# Patient Record
Sex: FEMALE | Race: WHITE | NOT HISPANIC OR LATINO
[De-identification: names, ages, dates, MRNs, and addresses within clinical notes are randomized per-mention and may not be internally consistent; named-entity substitution may affect disease eponyms.]

---

## 2018-01-11 ENCOUNTER — APPOINTMENT (OUTPATIENT)
Dept: FAMILY MEDICINE | Facility: CLINIC | Age: 33
End: 2018-01-11

## 2018-02-16 ENCOUNTER — APPOINTMENT (OUTPATIENT)
Dept: FAMILY MEDICINE | Facility: CLINIC | Age: 33
End: 2018-02-16
Payer: MEDICAID

## 2018-02-16 VITALS
HEART RATE: 90 BPM | OXYGEN SATURATION: 98 % | DIASTOLIC BLOOD PRESSURE: 100 MMHG | WEIGHT: 293 LBS | SYSTOLIC BLOOD PRESSURE: 160 MMHG | HEIGHT: 67 IN | BODY MASS INDEX: 45.99 KG/M2

## 2018-02-16 DIAGNOSIS — Z86.19 PERSONAL HISTORY OF OTHER INFECTIOUS AND PARASITIC DISEASES: ICD-10-CM

## 2018-02-16 DIAGNOSIS — J10.1 INFLUENZA DUE TO OTHER IDENTIFIED INFLUENZA VIRUS WITH OTHER RESPIRATORY MANIFESTATIONS: ICD-10-CM

## 2018-02-16 DIAGNOSIS — Z87.09 PERSONAL HISTORY OF OTHER DISEASES OF THE RESPIRATORY SYSTEM: ICD-10-CM

## 2018-02-16 DIAGNOSIS — Z87.898 PERSONAL HISTORY OF OTHER SPECIFIED CONDITIONS: ICD-10-CM

## 2018-02-16 DIAGNOSIS — Z87.42 PERSONAL HISTORY OF OTHER DISEASES OF THE FEMALE GENITAL TRACT: ICD-10-CM

## 2018-02-16 DIAGNOSIS — R53.81 OTHER MALAISE: ICD-10-CM

## 2018-02-16 DIAGNOSIS — N83.299 OTHER OVARIAN CYST, UNSPECIFIED SIDE: ICD-10-CM

## 2018-02-16 DIAGNOSIS — J98.01 ACUTE BRONCHOSPASM: ICD-10-CM

## 2018-02-16 DIAGNOSIS — Z87.01 PERSONAL HISTORY OF PNEUMONIA (RECURRENT): ICD-10-CM

## 2018-02-16 LAB — CYTOLOGY CVX/VAG DOC THIN PREP: NORMAL

## 2018-02-16 PROCEDURE — 99214 OFFICE O/P EST MOD 30 MIN: CPT

## 2018-03-02 ENCOUNTER — APPOINTMENT (OUTPATIENT)
Dept: FAMILY MEDICINE | Facility: CLINIC | Age: 33
End: 2018-03-02

## 2018-03-12 ENCOUNTER — APPOINTMENT (OUTPATIENT)
Dept: FAMILY MEDICINE | Facility: CLINIC | Age: 33
End: 2018-03-12
Payer: MEDICAID

## 2018-03-12 VITALS
BODY MASS INDEX: 45.99 KG/M2 | DIASTOLIC BLOOD PRESSURE: 70 MMHG | HEART RATE: 92 BPM | OXYGEN SATURATION: 97 % | WEIGHT: 293 LBS | SYSTOLIC BLOOD PRESSURE: 130 MMHG | HEIGHT: 67 IN

## 2018-03-12 DIAGNOSIS — R51 HEADACHE: ICD-10-CM

## 2018-03-12 PROCEDURE — 99214 OFFICE O/P EST MOD 30 MIN: CPT

## 2018-03-21 ENCOUNTER — LABORATORY RESULT (OUTPATIENT)
Age: 33
End: 2018-03-21

## 2018-03-21 ENCOUNTER — APPOINTMENT (OUTPATIENT)
Dept: FAMILY MEDICINE | Facility: CLINIC | Age: 33
End: 2018-03-21
Payer: MEDICAID

## 2018-03-21 VITALS
BODY MASS INDEX: 45.99 KG/M2 | HEIGHT: 67 IN | OXYGEN SATURATION: 98 % | WEIGHT: 293 LBS | HEART RATE: 81 BPM | SYSTOLIC BLOOD PRESSURE: 120 MMHG | DIASTOLIC BLOOD PRESSURE: 70 MMHG

## 2018-03-21 LAB
ALBUMIN SERPL ELPH-MCNC: 4.2 G/DL
ALP BLD-CCNC: 55 U/L
ALT SERPL-CCNC: 20 U/L
ANION GAP SERPL CALC-SCNC: 13 MMOL/L
AST SERPL-CCNC: 25 U/L
BASOPHILS # BLD AUTO: 0.02 K/UL
BASOPHILS NFR BLD AUTO: 0.5 %
BILIRUB SERPL-MCNC: 0.4 MG/DL
BUN SERPL-MCNC: 10 MG/DL
CALCIUM SERPL-MCNC: 9.3 MG/DL
CHLORIDE SERPL-SCNC: 105 MMOL/L
CHOLEST SERPL-MCNC: 114 MG/DL
CHOLEST/HDLC SERPL: 3.6 RATIO
CO2 SERPL-SCNC: 23 MMOL/L
CREAT SERPL-MCNC: 0.74 MG/DL
EOSINOPHIL # BLD AUTO: 0.07 K/UL
EOSINOPHIL NFR BLD AUTO: 1.6 %
GLUCOSE SERPL-MCNC: 95 MG/DL
HCT VFR BLD CALC: 24.5 %
HDLC SERPL-MCNC: 32 MG/DL
HGB BLD-MCNC: 6.9 G/DL
IMM GRANULOCYTES NFR BLD AUTO: 0.5 %
IRON SATN MFR SERPL: 4 %
IRON SERPL-MCNC: 15 UG/DL
LDLC SERPL CALC-MCNC: 65 MG/DL
LYMPHOCYTES # BLD AUTO: 1.21 K/UL
LYMPHOCYTES NFR BLD AUTO: 27.9 %
MAN DIFF?: NORMAL
MCHC RBC-ENTMCNC: 20.4 PG
MCHC RBC-ENTMCNC: 28.2 GM/DL
MCV RBC AUTO: 72.5 FL
MONOCYTES # BLD AUTO: 0.24 K/UL
MONOCYTES NFR BLD AUTO: 5.5 %
NEUTROPHILS # BLD AUTO: 2.78 K/UL
NEUTROPHILS NFR BLD AUTO: 64 %
PLATELET # BLD AUTO: 288 K/UL
POTASSIUM SERPL-SCNC: 4.7 MMOL/L
PROT SERPL-MCNC: 7.5 G/DL
RBC # BLD: 3.38 M/UL
RBC # FLD: 16.5 %
SODIUM SERPL-SCNC: 141 MMOL/L
TIBC SERPL-MCNC: 374 UG/DL
TRIGL SERPL-MCNC: 86 MG/DL
UIBC SERPL-MCNC: 359 UG/DL
WBC # FLD AUTO: 4.34 K/UL

## 2018-03-21 PROCEDURE — 36415 COLL VENOUS BLD VENIPUNCTURE: CPT

## 2018-03-21 PROCEDURE — 99395 PREV VISIT EST AGE 18-39: CPT | Mod: 25

## 2018-03-22 LAB
FERRITIN SERPL-MCNC: 3 NG/ML
FOLATE SERPL-MCNC: >20 NG/ML
HBA1C MFR BLD HPLC: 5.1 %
T4 FREE SERPL-MCNC: 1.1 NG/DL
TSH SERPL-ACNC: 2.06 UIU/ML
VIT B12 SERPL-MCNC: 204 PG/ML

## 2018-03-23 ENCOUNTER — APPOINTMENT (OUTPATIENT)
Dept: FAMILY MEDICINE | Facility: CLINIC | Age: 33
End: 2018-03-23
Payer: MEDICAID

## 2018-03-23 VITALS
OXYGEN SATURATION: 97 % | DIASTOLIC BLOOD PRESSURE: 80 MMHG | BODY MASS INDEX: 45.99 KG/M2 | HEIGHT: 67 IN | SYSTOLIC BLOOD PRESSURE: 140 MMHG | HEART RATE: 91 BPM | WEIGHT: 293 LBS

## 2018-03-23 PROCEDURE — 96372 THER/PROPH/DIAG INJ SC/IM: CPT

## 2018-03-23 PROCEDURE — 99214 OFFICE O/P EST MOD 30 MIN: CPT | Mod: 25

## 2018-03-23 RX ORDER — CYANOCOBALAMIN 1000 UG/ML
1000 INJECTION INTRAMUSCULAR; SUBCUTANEOUS
Qty: 0 | Refills: 0 | Status: COMPLETED | OUTPATIENT
Start: 2018-03-23

## 2018-03-23 RX ADMIN — CYANOCOBALAMIN 0 MCG/ML: 1000 INJECTION INTRAMUSCULAR; SUBCUTANEOUS at 00:00

## 2018-03-30 ENCOUNTER — APPOINTMENT (OUTPATIENT)
Dept: FAMILY MEDICINE | Facility: CLINIC | Age: 33
End: 2018-03-30
Payer: MEDICAID

## 2018-03-30 VITALS
DIASTOLIC BLOOD PRESSURE: 80 MMHG | BODY MASS INDEX: 45.99 KG/M2 | HEIGHT: 67 IN | SYSTOLIC BLOOD PRESSURE: 138 MMHG | HEART RATE: 87 BPM | WEIGHT: 293 LBS | OXYGEN SATURATION: 99 %

## 2018-03-30 PROCEDURE — 99214 OFFICE O/P EST MOD 30 MIN: CPT | Mod: 25

## 2018-03-30 PROCEDURE — 96372 THER/PROPH/DIAG INJ SC/IM: CPT

## 2018-03-30 RX ORDER — CYANOCOBALAMIN 1000 UG/ML
1000 INJECTION INTRAMUSCULAR; SUBCUTANEOUS
Qty: 0 | Refills: 0 | Status: COMPLETED | OUTPATIENT
Start: 2018-03-30

## 2018-03-30 RX ADMIN — CYANOCOBALAMIN 0 MCG/ML: 1000 INJECTION INTRAMUSCULAR; SUBCUTANEOUS at 00:00

## 2018-04-06 ENCOUNTER — APPOINTMENT (OUTPATIENT)
Dept: FAMILY MEDICINE | Facility: CLINIC | Age: 33
End: 2018-04-06

## 2018-04-13 ENCOUNTER — APPOINTMENT (OUTPATIENT)
Dept: FAMILY MEDICINE | Facility: CLINIC | Age: 33
End: 2018-04-13
Payer: MEDICAID

## 2018-04-13 VITALS
WEIGHT: 293 LBS | HEART RATE: 85 BPM | BODY MASS INDEX: 45.99 KG/M2 | SYSTOLIC BLOOD PRESSURE: 140 MMHG | OXYGEN SATURATION: 97 % | HEIGHT: 67 IN | DIASTOLIC BLOOD PRESSURE: 80 MMHG

## 2018-04-13 PROCEDURE — 99214 OFFICE O/P EST MOD 30 MIN: CPT | Mod: 25

## 2018-04-13 PROCEDURE — 96372 THER/PROPH/DIAG INJ SC/IM: CPT

## 2018-04-13 RX ORDER — CYANOCOBALAMIN 1000 UG/ML
1000 INJECTION INTRAMUSCULAR; SUBCUTANEOUS
Qty: 0 | Refills: 0 | Status: COMPLETED | OUTPATIENT
Start: 2018-04-13

## 2018-04-13 RX ADMIN — CYANOCOBALAMIN 0 MCG/ML: 1000 INJECTION INTRAMUSCULAR; SUBCUTANEOUS at 00:00

## 2018-04-20 ENCOUNTER — APPOINTMENT (OUTPATIENT)
Dept: FAMILY MEDICINE | Facility: CLINIC | Age: 33
End: 2018-04-20
Payer: MEDICAID

## 2018-04-20 VITALS
BODY MASS INDEX: 45.99 KG/M2 | OXYGEN SATURATION: 98 % | HEIGHT: 67 IN | DIASTOLIC BLOOD PRESSURE: 82 MMHG | SYSTOLIC BLOOD PRESSURE: 130 MMHG | WEIGHT: 293 LBS | HEART RATE: 90 BPM

## 2018-04-20 PROCEDURE — 96372 THER/PROPH/DIAG INJ SC/IM: CPT

## 2018-04-20 PROCEDURE — 99214 OFFICE O/P EST MOD 30 MIN: CPT | Mod: 25

## 2018-04-20 RX ORDER — CYANOCOBALAMIN 1000 UG/ML
1000 INJECTION INTRAMUSCULAR; SUBCUTANEOUS
Qty: 0 | Refills: 0 | Status: COMPLETED | OUTPATIENT
Start: 2018-04-20

## 2018-04-20 RX ADMIN — CYANOCOBALAMIN 0 MCG/ML: 1000 INJECTION INTRAMUSCULAR; SUBCUTANEOUS at 00:00

## 2018-04-21 LAB
FOLATE SERPL-MCNC: NORMAL
VIT B12 SERPL-MCNC: 434 PG/ML

## 2018-07-09 ENCOUNTER — APPOINTMENT (OUTPATIENT)
Dept: FAMILY MEDICINE | Facility: CLINIC | Age: 33
End: 2018-07-09
Payer: MEDICAID

## 2018-07-09 VITALS
RESPIRATION RATE: 18 BRPM | SYSTOLIC BLOOD PRESSURE: 118 MMHG | DIASTOLIC BLOOD PRESSURE: 70 MMHG | OXYGEN SATURATION: 99 % | WEIGHT: 293 LBS | HEART RATE: 80 BPM | HEIGHT: 67 IN | BODY MASS INDEX: 45.99 KG/M2

## 2018-07-09 PROCEDURE — 99214 OFFICE O/P EST MOD 30 MIN: CPT

## 2018-07-09 NOTE — HEALTH RISK ASSESSMENT
[] : No [No falls in past year] : Patient reported no falls in the past year [0] : 2) Feeling down, depressed, or hopeless: Not at all (0) [Good] : ~his/her~  mood as  good [Patient reported PAP Smear was normal] : Patient reported PAP Smear was normal [None] : None [With Significant Other] : lives with significant other [With Family] : lives with family [Employed] : employed [] :  [# Of Children ___] : has [unfilled] children [Fully functional (bathing, dressing, toileting, transferring, walking, feeding)] : Fully functional (bathing, dressing, toileting, transferring, walking, feeding) [Fully functional (using the telephone, shopping, preparing meals, housekeeping, doing laundry, using] : Fully functional and needs no help or supervision to perform IADLs (using the telephone, shopping, preparing meals, housekeeping, doing laundry, using transportation, managing medications and managing finances) [PapSmearDate] : 2017 [Discussed at today's visit] : Advance Directives Discussed at today's visit [FreeTextEntry4] : none

## 2018-07-09 NOTE — ASSESSMENT
[FreeTextEntry1] : patient was vitamin b12 deficiency and severely anemic. patient with known anemia - patient with 3 week periods.  saw mehrdad,  - everything was fine\par birth control controlling her periods, feeling much better, less tired\par \par Discussed diagnosis of hypertension with patient and need for medication compliance and possible side affects and risks of noncompliance. Patient was told to adhere to a low salt diet and try to incorporate exercise daily.\par stable\par The diagnosis of obesity was discussed with the patient. The patient was counseled on diet and exercise. Patient was advised to eat a diet low in carbohydrates and low in fat with high protein diet with plenty of vegetables. Patient was counseled to eat small meals throughout the day avoiding carbohydrates, foods high in fat, foods that are fried and fast food.  Patient was advised to monitor fluid intake, to drink at least 8 glasses of pure water a day and reduce/stop intake of all sugar drinks including juice and soda. Patient was advised to try and exercise for 30-40 minutes per day for at least three days a week. Pros and cons of using medical management for weight loss versus diet/exercise alone was discussed. Medication options were provided for the patient and side affects and risks were identified and discussed.  Patient was advised to take any medications as prescribed and to call office or go to the ER immediately if any issues with the medications occur. All questions were answered.\par \par phentermine - reviewed category x - restart phentermine

## 2018-07-09 NOTE — HISTORY OF PRESENT ILLNESS
[de-identified] : 32 year old female is here for a followup visit. Patient is here for medication renewals and for blood work discussion. Medications and allergies were reviewed and assessed.  There has been no new medications since the last visit. Patient is feeling well with no active changes or issues since Her last visit.\par patient feeling better, periods have become more regular\par feels phentermine was helping a lot\par

## 2018-08-06 ENCOUNTER — APPOINTMENT (OUTPATIENT)
Dept: FAMILY MEDICINE | Facility: CLINIC | Age: 33
End: 2018-08-06
Payer: MEDICAID

## 2018-08-06 VITALS
RESPIRATION RATE: 18 BRPM | WEIGHT: 293 LBS | OXYGEN SATURATION: 100 % | DIASTOLIC BLOOD PRESSURE: 72 MMHG | SYSTOLIC BLOOD PRESSURE: 124 MMHG | HEART RATE: 71 BPM | BODY MASS INDEX: 45.99 KG/M2 | HEIGHT: 67 IN

## 2018-08-06 PROCEDURE — 99214 OFFICE O/P EST MOD 30 MIN: CPT

## 2018-08-06 NOTE — HISTORY OF PRESENT ILLNESS
[de-identified] : 32 year old female is here for a followup visit. Patient is here for medication renewals and for blood work discussion. Medications and allergies were reviewed and assessed.  There has been no new medications since the last visit. Patient is feeling well with no active changes or issues since Her last visit.\par patient feeling better, periods have become more regular\par feels phentermine was helping, but now feels its wearing off at night and then she is binging\par

## 2018-08-06 NOTE — ASSESSMENT
[FreeTextEntry1] : patient was vitamin b12 deficiency and severely anemic. patient with known anemia - patient with 3 week periods.  saw mehrdad,  - everything was fine\par birth control controlling her periods, feeling much better, less tired\par \par Discussed diagnosis of hypertension with patient and need for medication compliance and possible side affects and risks of noncompliance. Patient was told to adhere to a low salt diet and try to incorporate exercise daily.\par stable\par \par The diagnosis of obesity was discussed with the patient. The patient was counseled on diet and exercise. Patient was advised to eat a diet low in carbohydrates and low in fat with high protein diet with plenty of vegetables. Patient was counseled to eat small meals throughout the day avoiding carbohydrates, foods high in fat, foods that are fried and fast food.  Patient was advised to monitor fluid intake, to drink at least 8 glasses of pure water a day and reduce/stop intake of all sugar drinks including juice and soda. Patient was advised to try and exercise for 30-40 minutes per day for at least three days a week. Pros and cons of using medical management for weight loss versus diet/exercise alone was discussed. Medication options were provided for the patient and side affects and risks were identified and discussed.  Patient was advised to take any medications as prescribed and to call office or go to the ER immediately if any issues with the medications occur. All questions were answered.\par \par phentermine - reviewed category x - will increase phentermine to 30 and reassess in 4 weeks

## 2018-08-06 NOTE — PHYSICAL EXAM
[Well Nourished] : well nourished [Normal Oropharynx] : the oropharynx was normal [Clear to Auscultation] : lungs were clear to auscultation bilaterally [No Accessory Muscle Use] : no accessory muscle use [Regular Rhythm] : with a regular rhythm [Normal S1, S2] : normal S1 and S2 [Non Tender] : non-tender [No CVA Tenderness] : no CVA  tenderness [No Joint Swelling] : no joint swelling [Normal Gait] : normal gait [Normal Affect] : the affect was normal

## 2018-08-20 ENCOUNTER — MEDICATION RENEWAL (OUTPATIENT)
Age: 33
End: 2018-08-20

## 2018-09-10 ENCOUNTER — APPOINTMENT (OUTPATIENT)
Dept: FAMILY MEDICINE | Facility: CLINIC | Age: 33
End: 2018-09-10

## 2018-10-08 ENCOUNTER — APPOINTMENT (OUTPATIENT)
Dept: FAMILY MEDICINE | Facility: CLINIC | Age: 33
End: 2018-10-08

## 2018-10-15 ENCOUNTER — APPOINTMENT (OUTPATIENT)
Dept: FAMILY MEDICINE | Facility: CLINIC | Age: 33
End: 2018-10-15
Payer: MEDICAID

## 2018-10-15 VITALS
OXYGEN SATURATION: 98 % | BODY MASS INDEX: 45.99 KG/M2 | HEIGHT: 67 IN | HEART RATE: 100 BPM | SYSTOLIC BLOOD PRESSURE: 132 MMHG | RESPIRATION RATE: 18 BRPM | WEIGHT: 293 LBS | DIASTOLIC BLOOD PRESSURE: 70 MMHG

## 2018-10-15 LAB — CYTOLOGY CVX/VAG DOC THIN PREP: NORMAL

## 2018-10-15 PROCEDURE — 99215 OFFICE O/P EST HI 40 MIN: CPT | Mod: 25

## 2018-10-15 PROCEDURE — 36415 COLL VENOUS BLD VENIPUNCTURE: CPT

## 2018-10-15 NOTE — ASSESSMENT
[FreeTextEntry1] : patient was vitamin b12 deficiency and severely anemic. patient with known anemia - patient with 3 week periods.  saw mehrdad,  -  \par birth control controlling her periods, feeling much better, less tired - \par patient has not followed up with injections\par \par Discussed diagnosis of hypertension with patient and need for medication compliance and possible side affects and risks of noncompliance. Patient was told to adhere to a low salt diet and try to incorporate exercise daily.\par stable\par \par The diagnosis of obesity was discussed with the patient. The patient was counseled on diet and exercise. Patient was advised to eat a diet low in carbohydrates and low in fat with high protein diet with plenty of vegetables. Patient was counseled to eat small meals throughout the day avoiding carbohydrates, foods high in fat, foods that are fried and fast food.  Patient was advised to monitor fluid intake, to drink at least 8 glasses of pure water a day and reduce/stop intake of all sugar drinks including juice and soda. Patient was advised to try and exercise for 30-40 minutes per day for at least three days a week. Pros and cons of using medical management for weight loss versus diet/exercise alone was discussed. Medication options were provided for the patient and side affects and risks were identified and discussed.  Patient was advised to take any medications as prescribed and to call office or go to the ER immediately if any issues with the medications occur. All questions were answered.\par \par phentermine - reviewed category x - hold off on phentermine for now\par \par Discussed diagnosis of anxiety and depression with the patient and potential outcomes/side affects of treatment versus non treatment. Medications were assessed and described at length. Side affects and black box warning were discussed.  Patient was advised to continue will all medications prescribed and the need for compliance was discussed and emphasized. Patient was advised to not stop medications without discussing with a health care provider first. Patient was advised to continue psychotherapy or seek therapy if not currently attending. Patient was educated on addictive potential of controlled substances and was counseled to use only as needed and sparingly. Patient verbalized understanding of all the above.\par patient in therapy - will start lexapro

## 2018-10-15 NOTE — HISTORY OF PRESENT ILLNESS
[de-identified] : 32 year old female is here for a followup visit. Patient is here for medication renewals and for blood work discussion. Medications and allergies were reviewed and assessed.  There has been no new medications since the last visit. Patient is feeling well with no active changes or issues since Her last visit.\par patient feeling better, periods have become more regular - has not had vitamin b12 injections\par patient going to therapy - was diagnosed with depression and anxiety\par patients father  2016\par

## 2018-10-16 LAB
ALBUMIN SERPL ELPH-MCNC: 4.6 G/DL
ALP BLD-CCNC: 57 U/L
ALT SERPL-CCNC: 16 U/L
ANION GAP SERPL CALC-SCNC: 13 MMOL/L
AST SERPL-CCNC: 17 U/L
BASOPHILS # BLD AUTO: 0.02 K/UL
BASOPHILS NFR BLD AUTO: 0.3 %
BILIRUB SERPL-MCNC: 0.3 MG/DL
BUN SERPL-MCNC: 8 MG/DL
CALCIUM SERPL-MCNC: 9.6 MG/DL
CHLORIDE SERPL-SCNC: 104 MMOL/L
CHOLEST SERPL-MCNC: 124 MG/DL
CHOLEST/HDLC SERPL: 3.1 RATIO
CO2 SERPL-SCNC: 23 MMOL/L
CREAT SERPL-MCNC: 0.8 MG/DL
EOSINOPHIL # BLD AUTO: 0.05 K/UL
EOSINOPHIL NFR BLD AUTO: 0.7 %
FOLATE SERPL-MCNC: 19.8 NG/ML
GLUCOSE SERPL-MCNC: 85 MG/DL
HBA1C MFR BLD HPLC: 5.4 %
HCT VFR BLD CALC: 34 %
HDLC SERPL-MCNC: 40 MG/DL
HGB BLD-MCNC: 9.8 G/DL
IMM GRANULOCYTES NFR BLD AUTO: 0.1 %
IRON SATN MFR SERPL: 4 %
IRON SERPL-MCNC: 20 UG/DL
LDLC SERPL CALC-MCNC: 69 MG/DL
LYMPHOCYTES # BLD AUTO: 1.72 K/UL
LYMPHOCYTES NFR BLD AUTO: 25.1 %
MAN DIFF?: NORMAL
MCHC RBC-ENTMCNC: 22.8 PG
MCHC RBC-ENTMCNC: 28.8 GM/DL
MCV RBC AUTO: 79.3 FL
MONOCYTES # BLD AUTO: 0.42 K/UL
MONOCYTES NFR BLD AUTO: 6.1 %
NEUTROPHILS # BLD AUTO: 4.62 K/UL
NEUTROPHILS NFR BLD AUTO: 67.7 %
PLATELET # BLD AUTO: 275 K/UL
POTASSIUM SERPL-SCNC: 4.3 MMOL/L
PROT SERPL-MCNC: 7.7 G/DL
RBC # BLD: 4.29 M/UL
RBC # FLD: 15.8 %
SODIUM SERPL-SCNC: 140 MMOL/L
T4 FREE SERPL-MCNC: 1.3 NG/DL
TIBC SERPL-MCNC: 450 UG/DL
TRIGL SERPL-MCNC: 74 MG/DL
TSH SERPL-ACNC: 3.54 UIU/ML
UIBC SERPL-MCNC: 430 UG/DL
VIT B12 SERPL-MCNC: 277 PG/ML
WBC # FLD AUTO: 6.84 K/UL

## 2018-10-17 LAB
THYROGLOB AB SERPL-ACNC: <20 IU/ML
THYROPEROXIDASE AB SERPL IA-ACNC: <10 IU/ML

## 2019-01-03 ENCOUNTER — TRANSCRIPTION ENCOUNTER (OUTPATIENT)
Age: 34
End: 2019-01-03

## 2019-01-30 ENCOUNTER — TRANSCRIPTION ENCOUNTER (OUTPATIENT)
Age: 34
End: 2019-01-30

## 2019-02-04 ENCOUNTER — APPOINTMENT (OUTPATIENT)
Dept: FAMILY MEDICINE | Facility: CLINIC | Age: 34
End: 2019-02-04
Payer: MEDICAID

## 2019-02-04 VITALS
HEIGHT: 67 IN | WEIGHT: 293 LBS | BODY MASS INDEX: 45.99 KG/M2 | SYSTOLIC BLOOD PRESSURE: 130 MMHG | DIASTOLIC BLOOD PRESSURE: 90 MMHG

## 2019-02-04 DIAGNOSIS — R80.9 PROTEINURIA, UNSPECIFIED: ICD-10-CM

## 2019-02-04 PROCEDURE — 99214 OFFICE O/P EST MOD 30 MIN: CPT

## 2019-02-04 RX ORDER — PHENTERMINE HYDROCHLORIDE 30 MG/1
30 CAPSULE ORAL
Qty: 30 | Refills: 0 | Status: DISCONTINUED | COMMUNITY
Start: 2018-08-20 | End: 2019-02-04

## 2019-02-04 RX ORDER — NORETHINDRONE 0.35 MG/1
0.35 TABLET ORAL
Refills: 0 | Status: DISCONTINUED | COMMUNITY
End: 2019-02-04

## 2019-02-04 NOTE — HISTORY OF PRESENT ILLNESS
[de-identified] : 32 year old female is here for a followup visit after going to urgent care and having protein in her urine. Pradipn also found to have protein in her urine. Patient is here for medication renewals and for blood work discussion. Medications and allergies were reviewed and assessed.  There has been no new medications since the last visit. Patient is feeling well with no active changes or issues since Her last visit.\par patient feeling better, periods have become more regular - has not had vitamin b12 injections\par patient going to therapy - was diagnosed with depression and anxiety\par patients father  2016\par

## 2019-02-04 NOTE — ASSESSMENT
[FreeTextEntry1] : patient was vitamin b12 deficiency and severely anemic. patient with known anemia - patient with 3 week periods.  saw mehrdad,  -  \par birth control controlling her periods, feeling much better, less tired - \par patient has not followed up with injections\par \par Discussed diagnosis of hypertension with patient and need for medication compliance and possible side affects and risks of noncompliance. Patient was told to adhere to a low salt diet and try to incorporate exercise daily.\par stable\par \par The diagnosis of obesity was discussed with the patient. The patient was counseled on diet and exercise. Patient was advised to eat a diet low in carbohydrates and low in fat with high protein diet with plenty of vegetables. Patient was counseled to eat small meals throughout the day avoiding carbohydrates, foods high in fat, foods that are fried and fast food.  Patient was advised to monitor fluid intake, to drink at least 8 glasses of pure water a day and reduce/stop intake of all sugar drinks including juice and soda. Patient was advised to try and exercise for 30-40 minutes per day for at least three days a week. Pros and cons of using medical management for weight loss versus diet/exercise alone was discussed. Medication options were provided for the patient and side affects and risks were identified and discussed.  Patient was advised to take any medications as prescribed and to call office or go to the ER immediately if any issues with the medications occur. All questions were answered.\par \par \par Discussed diagnosis of anxiety and depression with the patient and potential outcomes/side affects of treatment versus non treatment. Medications were assessed and described at length. Side affects and black box warning were discussed.  Patient was advised to continue will all medications prescribed and the need for compliance was discussed and emphasized. Patient was advised to not stop medications without discussing with a health care provider first. Patient was advised to continue psychotherapy or seek therapy if not currently attending. Patient was educated on addictive potential of controlled substances and was counseled to use only as needed and sparingly. Patient verbalized understanding of all the above.\par always battling depression and reports that she is not enjoying her children and life\par patient in therapy - never started the lexapro - will start now\par \par urine - will recheck and culture

## 2019-02-05 LAB
APPEARANCE: CLEAR
BACTERIA: NEGATIVE
BILIRUBIN URINE: NEGATIVE
BLOOD URINE: ABNORMAL
COLOR: YELLOW
GLUCOSE QUALITATIVE U: NEGATIVE MG/DL
HYALINE CASTS: 5 /LPF
KETONES URINE: NEGATIVE
LEUKOCYTE ESTERASE URINE: NEGATIVE
MICROSCOPIC-UA: NORMAL
NITRITE URINE: NEGATIVE
PH URINE: 7
PROTEIN URINE: NEGATIVE MG/DL
RED BLOOD CELLS URINE: 2 /HPF
SPECIFIC GRAVITY URINE: 1.02
SQUAMOUS EPITHELIAL CELLS: 2 /HPF
UROBILINOGEN URINE: NEGATIVE MG/DL
WHITE BLOOD CELLS URINE: 1 /HPF

## 2019-02-07 LAB — BACTERIA UR CULT: NORMAL

## 2019-03-11 ENCOUNTER — MEDICATION RENEWAL (OUTPATIENT)
Age: 34
End: 2019-03-11

## 2019-04-05 ENCOUNTER — APPOINTMENT (OUTPATIENT)
Dept: FAMILY MEDICINE | Facility: CLINIC | Age: 34
End: 2019-04-05
Payer: MEDICAID

## 2019-04-05 VITALS
BODY MASS INDEX: 45.99 KG/M2 | HEIGHT: 67 IN | DIASTOLIC BLOOD PRESSURE: 80 MMHG | SYSTOLIC BLOOD PRESSURE: 130 MMHG | WEIGHT: 293 LBS

## 2019-04-05 PROCEDURE — 99214 OFFICE O/P EST MOD 30 MIN: CPT

## 2019-04-05 NOTE — ASSESSMENT
[FreeTextEntry1] : patient was vitamin b12 deficiency and severely anemic. patient with known anemia - patient with 3 week periods.  saw mehrdad,  -  \par birth control controlling her periods, feeling much better, less tired - \par patient has not followed up with injections\par \par Discussed diagnosis of hypertension with patient and need for medication compliance and possible side affects and risks of noncompliance. Patient was told to adhere to a low salt diet and try to incorporate exercise daily.\par stable\par \par The diagnosis of obesity was discussed with the patient. The patient was counseled on diet and exercise. Patient was advised to eat a diet low in carbohydrates and low in fat with high protein diet with plenty of vegetables. Patient was counseled to eat small meals throughout the day avoiding carbohydrates, foods high in fat, foods that are fried and fast food.  Patient was advised to monitor fluid intake, to drink at least 8 glasses of pure water a day and reduce/stop intake of all sugar drinks including juice and soda. Patient was advised to try and exercise for 30-40 minutes per day for at least three days a week. Pros and cons of using medical management for weight loss versus diet/exercise alone was discussed. Medication options were provided for the patient and side affects and risks were identified and discussed.  Patient was advised to take any medications as prescribed and to call office or go to the ER immediately if any issues with the medications occur. All questions were answered.\par patient working out - feeling better\par \par Discussed diagnosis of anxiety and depression with the patient and potential outcomes/side affects of treatment versus non treatment. Medications were assessed and described at length. Side affects and black box warning were discussed.  Patient was advised to continue will all medications prescribed and the need for compliance was discussed and emphasized. Patient was advised to not stop medications without discussing with a health care provider first. Patient was advised to continue psychotherapy or seek therapy if not currently attending. Patient was educated on addictive potential of controlled substances and was counseled to use only as needed and sparingly. Patient verbalized understanding of all the above.\par always battling depression and reports that she is not enjoying her children and life\par patient in therapy - now on lexapro 10 - doing much better but tired\par will switch to morning\par \par

## 2019-04-05 NOTE — PHYSICAL EXAM
[Well Nourished] : well nourished [Normal Oropharynx] : the oropharynx was normal [Clear to Auscultation] : lungs were clear to auscultation bilaterally [No Accessory Muscle Use] : no accessory muscle use [Regular Rhythm] : with a regular rhythm [Normal S1, S2] : normal S1 and S2 [Non Tender] : non-tender [No CVA Tenderness] : no CVA  tenderness [No Joint Swelling] : no joint swelling [Normal Gait] : normal gait [Normal Affect] : the affect was normal [de-identified] : happier

## 2019-04-05 NOTE — HISTORY OF PRESENT ILLNESS
[de-identified] : 33 year old female is here for a followup visit for mood/depression. Medications and allergies were reviewed and assessed.  There has been no new medications since the last visit. Patient is feeling well with no active changes or issues since Her last visit.\par \par patient feeling better, periods have become more regular - has not had vitamin b12 injections\par patient going to therapy - was diagnosed with depression and anxiety\par now on lexapro and is feeling better although its tired\par patients father  2016\par

## 2019-04-05 NOTE — REVIEW OF SYSTEMS
[Fatigue] : fatigue [Anxiety] : anxiety [Depression] : depression [Negative] : Heme/Lymph [de-identified] : improved

## 2019-04-08 ENCOUNTER — TRANSCRIPTION ENCOUNTER (OUTPATIENT)
Age: 34
End: 2019-04-08

## 2019-06-04 ENCOUNTER — APPOINTMENT (OUTPATIENT)
Dept: FAMILY MEDICINE | Facility: CLINIC | Age: 34
End: 2019-06-04
Payer: MEDICAID

## 2019-06-04 ENCOUNTER — APPOINTMENT (OUTPATIENT)
Dept: ENDOCRINOLOGY | Facility: CLINIC | Age: 34
End: 2019-06-04
Payer: MEDICAID

## 2019-06-04 VITALS
DIASTOLIC BLOOD PRESSURE: 80 MMHG | BODY MASS INDEX: 45.99 KG/M2 | RESPIRATION RATE: 18 BRPM | HEART RATE: 65 BPM | HEIGHT: 67 IN | SYSTOLIC BLOOD PRESSURE: 130 MMHG | OXYGEN SATURATION: 99 % | WEIGHT: 293 LBS

## 2019-06-04 PROCEDURE — 96372 THER/PROPH/DIAG INJ SC/IM: CPT

## 2019-06-04 PROCEDURE — 36415 COLL VENOUS BLD VENIPUNCTURE: CPT

## 2019-06-04 PROCEDURE — 99204 OFFICE O/P NEW MOD 45 MIN: CPT | Mod: 25

## 2019-06-04 PROCEDURE — 99214 OFFICE O/P EST MOD 30 MIN: CPT | Mod: 25

## 2019-06-04 RX ORDER — CYANOCOBALAMIN 1000 UG/ML
1000 INJECTION INTRAMUSCULAR; SUBCUTANEOUS
Qty: 0 | Refills: 0 | Status: COMPLETED | OUTPATIENT
Start: 2019-06-04

## 2019-06-04 RX ADMIN — CYANOCOBALAMIN 0 MCG/ML: 1000 INJECTION INTRAMUSCULAR; SUBCUTANEOUS at 00:00

## 2019-06-04 NOTE — ASSESSMENT
[FreeTextEntry1] : Possible underlying PCOS, but would need to exclude other endocrinopathies, including NC-CAH and Cushing syndrome.\par  - will check  testosterone, estradiol, gonadotropins, prolactin,  insulin/glucose levels and 17-OHP/pregnenolone, thyroid panel, a1c, fasting insulin\par  - 24hrs UFC \par If suspicious for NC-CAH, will proceed with ACTH stimulation test +/- genetic testing. \par Genetic implications of NC-CAH reviewed with the patient.\par  If confirmed insulin resistance, will consider trial of Metformin. Meanwhile continue OCP's.\par - Current approaches to weight management are discussed with the patient. \par Suggested extensive nutritional education program. Proper dietary restrictions and exercise routines discussed. \par Medical weight loss therapies were reviewed with the patient.\par Discussed with the patient referral to a bariatric surgeon, and she wants to discuss this with her .\par RTC 2-3 weeks for results\par \par \par

## 2019-06-04 NOTE — CONSULT LETTER
[Dear  ___] : Dear  [unfilled], [Courtesy Letter:] : I had the pleasure of seeing your patient, [unfilled], in my office today. [Sincerely,] : Sincerely, [FreeTextEntry1] : Thank you for referring  Ms. RANI CHRISTIE to me for evaluation and treatment. Please, see attached consultation note. As always, if there are specific questions you would like to discuss, please feel free to contact me.\par Thank you for the courtesy of this evaluation.\par  [FreeTextEntry3] : Ced Villavicencio MD, FACE, [facility]\par

## 2019-06-04 NOTE — HEALTH RISK ASSESSMENT
[No falls in past year] : Patient reported no falls in the past year [0] : 2) Feeling down, depressed, or hopeless: Not at all (0) [Good] : ~his/her~  mood as  good [Patient reported PAP Smear was normal] : Patient reported PAP Smear was normal [None] : None [With Significant Other] : lives with significant other [With Family] : lives with family [Employed] : employed [] :  [# Of Children ___] : has [unfilled] children [Fully functional (bathing, dressing, toileting, transferring, walking, feeding)] : Fully functional (bathing, dressing, toileting, transferring, walking, feeding) [Fully functional (using the telephone, shopping, preparing meals, housekeeping, doing laundry, using] : Fully functional and needs no help or supervision to perform IADLs (using the telephone, shopping, preparing meals, housekeeping, doing laundry, using transportation, managing medications and managing finances) [Discussed at today's visit] : Advance Directives Discussed at today's visit [] : No [PapSmearDate] : 2017 [FreeTextEntry4] : none

## 2019-06-04 NOTE — REVIEW OF SYSTEMS
[Fatigue] : fatigue [Anxiety] : anxiety [Depression] : depression [Negative] : Heme/Lymph [de-identified] : improved

## 2019-06-04 NOTE — ASSESSMENT
[FreeTextEntry1] : ANEMIA\par patient was vitamin b12 deficiency and severely anemic. patient with known anemia - patient with 3 week periods.  saw mehrdad,  -  \par birth control controlling her periods, feeling much better, less tired - \par patient has not followed up with injections\par gave one today\par \par HYPERTENSION\par Discussed diagnosis of hypertension with patient and need for medication compliance and possible side affects and risks of noncompliance. Patient was told to adhere to a low salt diet and try to incorporate exercise daily.\par stable\par \par OBESITY\par The diagnosis of obesity was discussed with the patient. The patient was counseled on diet and exercise. Patient was advised to eat a diet low in carbohydrates and low in fat with high protein diet with plenty of vegetables. Patient was counseled to eat small meals throughout the day avoiding carbohydrates, foods high in fat, foods that are fried and fast food.  Patient was advised to monitor fluid intake, to drink at least 8 glasses of pure water a day and reduce/stop intake of all sugar drinks including juice and soda. Patient was advised to try and exercise for 30-40 minutes per day for at least three days a week. Pros and cons of using medical management for weight loss versus diet/exercise alone was discussed. Medication options were provided for the patient and side affects and risks were identified and discussed.  Patient was advised to take any medications as prescribed and to call office or go to the ER immediately if any issues with the medications occur. All questions were answered.\par did not lose any weight this month\par medicine does not make her hungry, binges at night\par saw endo today, getting blood work\par \par ANXIETY\par Discussed diagnosis of anxiety and depression with the patient and potential outcomes/side affects of treatment versus non treatment. Medications were assessed and described at length. Side affects and black box warning were discussed.  Patient was advised to continue will all medications prescribed and the need for compliance was discussed and emphasized. Patient was advised to not stop medications without discussing with a health care provider first. Patient was advised to continue psychotherapy or seek therapy if not currently attending. Patient was educated on addictive potential of controlled substances and was counseled to use only as needed and sparingly. Patient verbalized understanding of all the above.\par always battling depression and reports that she is not enjoying her children and life\par patient in therapy - now on lexapro 10 - doing much better but tired\par will switch to morning\par \par \par

## 2019-06-04 NOTE — HISTORY OF PRESENT ILLNESS
[FreeTextEntry1] : 33 year  female referred for hormonal evaluation.\par She was struggling with her weight since her childhood. She gained about 100 lbs in her senior year of high school, and had continuously kept gaining weight since then. She was diagnosed with mild hypothyroidism during her fertility evaluation about 9 years ago, and was on L-thyroxine replacement for about a year. Since then her thyroid levels were fine, and she never required treatment with her second pregnancy. She was also diagnosed with a prediabetes at that time , and was briefly on metformin. \par complains of  irregular menses x many years\par Menarche at 14 yo. \par LMP - currently now. \par She is on OCP's right now, and also taking phentermine; however is not successful on it. She tried weight watchers and other physician supervised weight loss program, which did not result in any meaningful weight loss.\par Had  difficulties getting pregnant , and undergone fertility treatment\par Miscarriages - none\par +facial acne \par Denies increased facial/body hair growth, but does complain of scalp hair thinning\par Denies: breast discharge, shoe/ring size change, easy bruising or new purple stretch marks on the abdomen/thighs. \par Last gyn exam- 2018\par Pelvic US - 2018, reports normal.\par Last TSH- 3.54, a1c- 5.4\par

## 2019-06-04 NOTE — PHYSICAL EXAM
[Well Nourished] : well nourished [Normal Oropharynx] : the oropharynx was normal [Clear to Auscultation] : lungs were clear to auscultation bilaterally [No Accessory Muscle Use] : no accessory muscle use [Regular Rhythm] : with a regular rhythm [Normal S1, S2] : normal S1 and S2 [Non Tender] : non-tender [No CVA Tenderness] : no CVA  tenderness [No Joint Swelling] : no joint swelling [Normal Gait] : normal gait [Normal Affect] : the affect was normal [de-identified] : happier

## 2019-06-05 LAB
IRON SATN MFR SERPL: 6 %
IRON SERPL-MCNC: 25 UG/DL
TIBC SERPL-MCNC: 406 UG/DL
UIBC SERPL-MCNC: 381 UG/DL

## 2019-06-10 LAB
17OHP SERPL-MCNC: <10 NG/DL
25(OH)D3 SERPL-MCNC: 22.4 NG/ML
ALBUMIN SERPL ELPH-MCNC: 4.3 G/DL
ALP BLD-CCNC: 58 U/L
ALT SERPL-CCNC: 22 U/L
ANION GAP SERPL CALC-SCNC: 14 MMOL/L
ANTI-MUELLERIAN HORMONE: 0.54 NG/ML
AST SERPL-CCNC: 23 U/L
BASOPHILS # BLD AUTO: 0.03 K/UL
BASOPHILS NFR BLD AUTO: 0.4 %
BILIRUB SERPL-MCNC: 0.3 MG/DL
BUN SERPL-MCNC: 10 MG/DL
CALCIUM SERPL-MCNC: 9.6 MG/DL
CHLORIDE SERPL-SCNC: 105 MMOL/L
CHOLEST SERPL-MCNC: 137 MG/DL
CHOLEST/HDLC SERPL: 3.3 RATIO
CO2 SERPL-SCNC: 22 MMOL/L
CREAT SERPL-MCNC: 0.8 MG/DL
EOSINOPHIL # BLD AUTO: 0.13 K/UL
EOSINOPHIL NFR BLD AUTO: 1.6 %
ESTIMATED AVERAGE GLUCOSE: 97 MG/DL
ESTRADIOL SERPL-MCNC: 37 PG/ML
FSH SERPL-MCNC: 8.6 IU/L
GLUCOSE SERPL-MCNC: 77 MG/DL
HBA1C MFR BLD HPLC: 5 %
HCG SERPL QL: NEGATIVE
HCT VFR BLD CALC: 36.8 %
HDLC SERPL-MCNC: 42 MG/DL
HGB BLD-MCNC: 10.6 G/DL
IMM GRANULOCYTES NFR BLD AUTO: 0.5 %
INSULIN SERPL-MCNC: 14.6 UU/ML
LDLC SERPL CALC-MCNC: 83 MG/DL
LH SERPL-ACNC: 4.2 IU/L
LYMPHOCYTES # BLD AUTO: 1.61 K/UL
LYMPHOCYTES NFR BLD AUTO: 20.3 %
MAN DIFF?: NORMAL
MCHC RBC-ENTMCNC: 25.5 PG
MCHC RBC-ENTMCNC: 28.8 GM/DL
MCV RBC AUTO: 88.5 FL
MONOCYTES # BLD AUTO: 0.41 K/UL
MONOCYTES NFR BLD AUTO: 5.2 %
NEUTROPHILS # BLD AUTO: 5.71 K/UL
NEUTROPHILS NFR BLD AUTO: 72 %
PAPP-A SERPL-ACNC: <1 MIU/ML
PLATELET # BLD AUTO: 278 K/UL
POTASSIUM SERPL-SCNC: 4.4 MMOL/L
PROLACTIN SERPL-MCNC: 9.8 NG/ML
PROT SERPL-MCNC: 7.5 G/DL
RBC # BLD: 4.16 M/UL
RBC # FLD: 17.1 %
SHBG SERPL-SCNC: 22 NMOL/L
SODIUM SERPL-SCNC: 141 MMOL/L
T4 FREE SERPL-MCNC: 1.1 NG/DL
THYROGLOB AB SERPL-ACNC: <20 IU/ML
THYROPEROXIDASE AB SERPL IA-ACNC: <10 IU/ML
TRIGL SERPL-MCNC: 62 MG/DL
TSH SERPL-ACNC: 2.1 UIU/ML
VIT B12 SERPL-MCNC: 250 PG/ML
WBC # FLD AUTO: 7.93 K/UL

## 2019-06-11 LAB
17OH-PREG SERPL-MCNC: 37 NG/DL
TESTOST BND SERPL-MCNC: 2.2 PG/ML
TESTOST SERPL-MCNC: 24.2 NG/DL

## 2019-06-18 LAB
CREAT 24H UR-MCNC: 1.6 G/24 H
CREAT ?TM UR-MCNC: 132 MG/DL
PROT ?TM UR-MCNC: 24 HR
SPECIMEN VOL 24H UR: 1250 ML

## 2019-06-26 LAB
CORTIS 24H UR-MCNC: NORMAL
CORTIS 24H UR-MRATE: NORMAL
SPECIMEN VOL 24H UR: NORMAL

## 2019-07-01 ENCOUNTER — APPOINTMENT (OUTPATIENT)
Dept: ENDOCRINOLOGY | Facility: CLINIC | Age: 34
End: 2019-07-01
Payer: MEDICAID

## 2019-07-01 ENCOUNTER — TRANSCRIPTION ENCOUNTER (OUTPATIENT)
Age: 34
End: 2019-07-01

## 2019-07-01 VITALS
RESPIRATION RATE: 18 BRPM | OXYGEN SATURATION: 98 % | DIASTOLIC BLOOD PRESSURE: 80 MMHG | SYSTOLIC BLOOD PRESSURE: 122 MMHG | WEIGHT: 293 LBS | BODY MASS INDEX: 45.99 KG/M2 | HEIGHT: 67 IN | HEART RATE: 81 BPM

## 2019-07-01 PROCEDURE — 99213 OFFICE O/P EST LOW 20 MIN: CPT

## 2019-07-01 NOTE — ASSESSMENT
[FreeTextEntry1] : Possible underlying PCOS\par - fasting insulin is inappropriate for glucose levels. D/w starting metformin, but patient prefers holding for now\par - patient will finish phentrmine 3 -month trial, and likely to switch to another medication since she's not losing weight so far\par - advised on interractions b.w some of wt loss meds and SSRI's\par - cont OCP's for now\par - Current approaches to weight management are discussed with the patient. \par Suggested extensive nutritional education program. Proper dietary restrictions and exercise routines discussed. \par Medical weight loss therapies were reviewed with the patient.\par Discussed with the patient referral to a bariatric surgeon, will refer\par - return to PCP for B12 injection\par - resume iron supplem + OTC vit D3 2,000 IU/day\par RTC 1 month\par \par \par

## 2019-07-01 NOTE — HISTORY OF PRESENT ILLNESS
[FreeTextEntry1] : 33 year  female f/u for hormonal evaluation.\par 24h UFC- 18\par TSH- 2.1\par neg tpo/tg ab\par B12- 250\par 25D- 22\par testo- 24.2. normal 17ohP/Preg\par fasting glu- 77 with insulin 14\par \par HPI:\par She was struggling with her weight since her childhood. She gained about 100 lbs in her senior year of high school, and had continuously kept gaining weight since then. She was diagnosed with mild hypothyroidism during her fertility evaluation about 9 years ago, and was on L-thyroxine replacement for about a year. Since then her thyroid levels were fine, and she never required treatment with her second pregnancy. She was also diagnosed with a prediabetes at that time , and was briefly on metformin. \par complains of  irregular menses x many years\par Menarche at 12 yo. \par LMP - currently now. \par She is on OCP's right now, and also taking phentermine; however is not successful on it. She tried weight watchers and other physician supervised weight loss program, which did not result in any meaningful weight loss.\par Had  difficulties getting pregnant , and undergone fertility treatment\par Miscarriages - none\par +facial acne \par Denies increased facial/body hair growth, but does complain of scalp hair thinning\par Denies: breast discharge, shoe/ring size change, easy bruising or new purple stretch marks on the abdomen/thighs. \par Last gyn exam- 2018\par Pelvic US - 2018, reports normal.\par Last TSH- 3.54, a1c- 5.4\par

## 2020-02-05 ENCOUNTER — APPOINTMENT (OUTPATIENT)
Dept: FAMILY MEDICINE | Facility: CLINIC | Age: 35
End: 2020-02-05
Payer: MEDICAID

## 2020-02-05 VITALS
OXYGEN SATURATION: 99 % | WEIGHT: 293 LBS | BODY MASS INDEX: 45.99 KG/M2 | HEART RATE: 82 BPM | TEMPERATURE: 97.9 F | SYSTOLIC BLOOD PRESSURE: 122 MMHG | HEIGHT: 67 IN | DIASTOLIC BLOOD PRESSURE: 80 MMHG

## 2020-02-05 PROCEDURE — 99395 PREV VISIT EST AGE 18-39: CPT | Mod: 25

## 2020-02-05 PROCEDURE — 36415 COLL VENOUS BLD VENIPUNCTURE: CPT

## 2020-02-05 RX ORDER — PHENTERMINE HYDROCHLORIDE 30 MG/1
30 CAPSULE ORAL
Qty: 30 | Refills: 0 | Status: DISCONTINUED | COMMUNITY
Start: 2018-03-21 | End: 2020-02-05

## 2020-02-05 RX ORDER — VALSARTAN 80 MG/1
80 TABLET, COATED ORAL
Qty: 90 | Refills: 1 | Status: DISCONTINUED | COMMUNITY
Start: 2018-02-16 | End: 2020-02-05

## 2020-02-05 NOTE — HISTORY OF PRESENT ILLNESS
[de-identified] : 34 year old female  here for annual well visit. Patient's blood work was drawn and medications reviewed. Patient's past medical history was reviewed, allergies verified and problems were identified and assessed. Patients medications were reviewed. Patient is feeling well with no new or active complaints at this time.\par \par patient feeling better, periods have become more regular - has not had vitamin b12 injections\par here for obesity management as well\par patient going to therapy - was diagnosed with depression and anxiety\par now on lexapro and is feeling better although its tired\par patients father  2016\par

## 2020-02-05 NOTE — HEALTH RISK ASSESSMENT
[No falls in past year] : Patient reported no falls in the past year [Good] : ~his/her~  mood as  good [0] : 2) Feeling down, depressed, or hopeless: Not at all (0) [Patient reported PAP Smear was normal] : Patient reported PAP Smear was normal [None] : None [With Significant Other] : lives with significant other [With Family] : lives with family [Employed] : employed [] :  [# Of Children ___] : has [unfilled] children [Fully functional (bathing, dressing, toileting, transferring, walking, feeding)] : Fully functional (bathing, dressing, toileting, transferring, walking, feeding) [Fully functional (using the telephone, shopping, preparing meals, housekeeping, doing laundry, using] : Fully functional and needs no help or supervision to perform IADLs (using the telephone, shopping, preparing meals, housekeeping, doing laundry, using transportation, managing medications and managing finances) [Discussed at today's visit] : Advance Directives Discussed at today's visit [PapSmearDate] : 2017 [] : No [FreeTextEntry4] : none

## 2020-02-05 NOTE — PHYSICAL EXAM
[Well Nourished] : well nourished [Normal Oropharynx] : the oropharynx was normal [Clear to Auscultation] : lungs were clear to auscultation bilaterally [Regular Rhythm] : with a regular rhythm [Normal S1, S2] : normal S1 and S2 [No Accessory Muscle Use] : no accessory muscle use [No CVA Tenderness] : no CVA  tenderness [Non Tender] : non-tender [No Joint Swelling] : no joint swelling [Normal Gait] : normal gait [Normal Affect] : the affect was normal [de-identified] : happier

## 2020-02-05 NOTE — ASSESSMENT
[FreeTextEntry1] : ANEMIA\par patient was vitamin b12 deficiency and severely anemic. patient with known anemia - patient with 3 week periods.  saw mehrdad,  -  \par birth control controlling her periods, feeling much better, less tired - since start of birth control\par \par HYPERTENSION\par no longer needing meds\par \par OBESITY\par The diagnosis of obesity was discussed with the patient. The patient was counseled on diet and exercise. Patient was advised to eat a diet low in carbohydrates and low in fat with high protein diet with plenty of vegetables. Patient was counseled to eat small meals throughout the day avoiding carbohydrates, foods high in fat, foods that are fried and fast food.  Patient was advised to monitor fluid intake, to drink at least 8 glasses of pure water a day and reduce/stop intake of all sugar drinks including juice and soda. Patient was advised to try and exercise for 30-40 minutes per day for at least three days a week. Pros and cons of using medical management for weight loss versus diet/exercise alone was discussed. Medication options were provided for the patient and side affects and risks were identified and discussed.  Patient was advised to take any medications as prescribed and to call office or go to the ER immediately if any issues with the medications occur. All questions were answered.\par going for sleeve 3/2020 - very excited\par \par ANXIETY\par Discussed diagnosis of anxiety and depression with the patient and potential outcomes/side affects of treatment versus non treatment. Medications were assessed and described at length. Side affects and black box warning were discussed.  Patient was advised to continue will all medications prescribed and the need for compliance was discussed and emphasized. Patient was advised to not stop medications without discussing with a health care provider first. Patient was advised to continue psychotherapy or seek therapy if not currently attending. Patient was educated on addictive potential of controlled substances and was counseled to use only as needed and sparingly. Patient verbalized understanding of all the above.\par always battling depression and reports that she is not enjoying her children and life\par patient in therapy - now on lexapro 10 - doing much better\par \par \par defer bw because getting gastric sleeve\par \par

## 2020-02-05 NOTE — REVIEW OF SYSTEMS
[Anxiety] : anxiety [Depression] : depression [Fatigue] : fatigue [Negative] : Neurological [de-identified] : improved

## 2020-03-17 ENCOUNTER — APPOINTMENT (OUTPATIENT)
Dept: FAMILY MEDICINE | Facility: CLINIC | Age: 35
End: 2020-03-17
Payer: MEDICAID

## 2020-03-17 VITALS
WEIGHT: 293 LBS | BODY MASS INDEX: 50.9 KG/M2 | DIASTOLIC BLOOD PRESSURE: 84 MMHG | SYSTOLIC BLOOD PRESSURE: 142 MMHG | HEART RATE: 77 BPM | OXYGEN SATURATION: 98 % | RESPIRATION RATE: 18 BRPM

## 2020-03-17 DIAGNOSIS — Z01.818 ENCOUNTER FOR OTHER PREPROCEDURAL EXAMINATION: ICD-10-CM

## 2020-03-17 PROCEDURE — 99214 OFFICE O/P EST MOD 30 MIN: CPT

## 2020-03-17 NOTE — HISTORY OF PRESENT ILLNESS
[No Pertinent Cardiac History] : no history of aortic stenosis, atrial fibrillation, coronary artery disease, recent myocardial infarction, or implantable device/pacemaker [No Pertinent Pulmonary History] : no history of asthma, COPD, sleep apnea, or smoking [Chronic Anticoagulation] : no chronic anticoagulation [Chronic Kidney Disease] : no chronic kidney disease [Diabetes] : no diabetes [(Patient denies any chest pain, claudication, dyspnea on exertion, orthopnea, palpitations or syncope)] : Patient denies any chest pain, claudication, dyspnea on exertion, orthopnea, palpitations or syncope [FreeTextEntry1] : laparoscopic sleeve gastrectomy [FreeTextEntry2] : 3/19/2020 [FreeTextEntry3] : Dr. Berkowitz [FreeTextEntry4] : 34 year old female  is here for medical clearance for an upcoming surgery for electric gastric sleeve with cholecystectomy.  All medical problems and medicines were documented and reviewed with the patient. \par Patient was counseled to stop any advil/alieve/aspirin 7 days prior to surgery and was advised to have nothing by mouth from 11 pm the night prior to surgery. \par Medications were reviewed with patient and patient was directed on which medications to be taken and not to be taken prior to surgery.\par Labs were reviewed with the patient.\par

## 2020-03-17 NOTE — ASSESSMENT
[Patient Optimized for Surgery] : Patient optimized for surgery [No Further Testing Recommended] : no further testing recommended [As per surgery] : as per surgery [FreeTextEntry4] : .34 year old female  is here for medical clearance for an upcoming surgery for electric gastric sleeve with cholecystectomy.  All medical problems and medicines were documented and reviewed with the patient. \par Patient was counseled to stop any advil/alieve/aspirin 7 days prior to surgery and was advised to have nothing by mouth from 11 pm the night prior to surgery. \par Medications were reviewed with patient and patient was directed on which medications to be taken and not to be taken prior to surgery.\par Labs were reviewed with the patient\par

## 2020-07-04 ENCOUNTER — TRANSCRIPTION ENCOUNTER (OUTPATIENT)
Age: 35
End: 2020-07-04

## 2020-09-15 ENCOUNTER — APPOINTMENT (OUTPATIENT)
Dept: FAMILY MEDICINE | Facility: CLINIC | Age: 35
End: 2020-09-15
Payer: COMMERCIAL

## 2020-09-15 VITALS
DIASTOLIC BLOOD PRESSURE: 82 MMHG | OXYGEN SATURATION: 98 % | HEIGHT: 67 IN | SYSTOLIC BLOOD PRESSURE: 124 MMHG | HEART RATE: 78 BPM | RESPIRATION RATE: 16 BRPM | WEIGHT: 293 LBS | BODY MASS INDEX: 45.99 KG/M2

## 2020-09-15 DIAGNOSIS — N64.4 MASTODYNIA: ICD-10-CM

## 2020-09-15 PROCEDURE — 99214 OFFICE O/P EST MOD 30 MIN: CPT | Mod: 25

## 2020-09-15 PROCEDURE — 36415 COLL VENOUS BLD VENIPUNCTURE: CPT

## 2020-09-15 NOTE — HISTORY OF PRESENT ILLNESS
[de-identified] : 34 year old female is here for a followup visit. Patient is here for medication renewals and for blood work discussion. Medications and allergies were reviewed and assessed.  There has been no new medications since the last visit. Patient is feeling well with no active changes or issues since Her last visit.\par \par patient feeling better, periods have become more regular - has not had vitamin b12 injections\par here for obesity management as well\par patient going to therapy - was diagnosed with depression and anxiety\par now on lexapro and is feeling better although its tired\par patients father  2016\par s/p gastric sleeve 2020 - lost 25 pounds\par

## 2020-09-15 NOTE — PHYSICAL EXAM
[Well Nourished] : well nourished [Normal Oropharynx] : the oropharynx was normal [Clear to Auscultation] : lungs were clear to auscultation bilaterally [Regular Rhythm] : with a regular rhythm [Non Tender] : non-tender [Normal S1, S2] : normal S1 and S2 [No Joint Swelling] : no joint swelling [Normal Gait] : normal gait [Normal Insight/Judgement] : insight and judgment were intact [Normal Affect] : the affect was normal [de-identified] : happier

## 2020-09-15 NOTE — ASSESSMENT
[FreeTextEntry1] : ANEMIA\par patient was vitamin b12 deficiency and severely anemic. patient with known anemia - \par birth control controlling her periods, feeling much better, less tired - since start of birth control\par \par HYPERTENSION\par no longer needing meds\par \par OBESITY\par The diagnosis of obesity was discussed with the patient. The patient was counseled on diet and exercise. Patient was advised to eat a diet low in carbohydrates and low in fat with high protein diet with plenty of vegetables. Patient was counseled to eat small meals throughout the day avoiding carbohydrates, foods high in fat, foods that are fried and fast food.  Patient was advised to monitor fluid intake, to drink at least 8 glasses of pure water a day and reduce/stop intake of all sugar drinks including juice and soda. Patient was advised to try and exercise for 30-40 minutes per day for at least three days a week. Pros and cons of using medical management for weight loss versus diet/exercise alone was discussed. Medication options were provided for the patient and side affects and risks were identified and discussed.  Patient was advised to take any medications as prescribed and to call office or go to the ER immediately if any issues with the medications occur. All questions were answered.\par gastric sleeve 3/2020\par \par ANXIETY\par Discussed diagnosis of anxiety and depression with the patient and potential outcomes/side affects of treatment versus non treatment. Medications were assessed and described at length. Side affects and black box warning were discussed.  Patient was advised to continue will all medications prescribed and the need for compliance was discussed and emphasized. Patient was advised to not stop medications without discussing with a health care provider first. Patient was advised to continue psychotherapy or seek therapy if not currently attending. Patient was educated on addictive potential of controlled substances and was counseled to use only as needed and sparingly. Patient verbalized understanding of all the above.\par always battling depression and reports that she is not enjoying her children and life\par patient in therapy - now on lexapro 10 - doing much better\par \par nipple soreness\par prolactin\par us of left breast\par

## 2020-09-15 NOTE — REVIEW OF SYSTEMS
[Fatigue] : fatigue [Anxiety] : anxiety [Depression] : depression [Negative] : Heme/Lymph [de-identified] : improved

## 2020-09-16 LAB
25(OH)D3 SERPL-MCNC: 25.8 NG/ML
ALBUMIN SERPL ELPH-MCNC: 4.5 G/DL
ALP BLD-CCNC: 60 U/L
ALT SERPL-CCNC: 11 U/L
ANION GAP SERPL CALC-SCNC: 13 MMOL/L
AST SERPL-CCNC: 20 U/L
BASOPHILS # BLD AUTO: 0.03 K/UL
BASOPHILS NFR BLD AUTO: 0.5 %
BILIRUB SERPL-MCNC: 0.5 MG/DL
BUN SERPL-MCNC: 8 MG/DL
CALCIUM SERPL-MCNC: 9.4 MG/DL
CHLORIDE SERPL-SCNC: 108 MMOL/L
CHOLEST SERPL-MCNC: 136 MG/DL
CHOLEST/HDLC SERPL: 3.4 RATIO
CO2 SERPL-SCNC: 21 MMOL/L
CREAT SERPL-MCNC: 0.81 MG/DL
EOSINOPHIL # BLD AUTO: 0.06 K/UL
EOSINOPHIL NFR BLD AUTO: 1 %
ESTIMATED AVERAGE GLUCOSE: 103 MG/DL
FERRITIN SERPL-MCNC: 6 NG/ML
FOLATE SERPL-MCNC: 15.4 NG/ML
GLUCOSE SERPL-MCNC: 82 MG/DL
HBA1C MFR BLD HPLC: 5.2 %
HCG SERPL-MCNC: <1 MIU/ML
HCT VFR BLD CALC: 33.5 %
HDLC SERPL-MCNC: 40 MG/DL
HGB BLD-MCNC: 9.6 G/DL
IMM GRANULOCYTES NFR BLD AUTO: 0.2 %
IRON SATN MFR SERPL: 6 %
IRON SERPL-MCNC: 28 UG/DL
LDLC SERPL CALC-MCNC: 80 MG/DL
LYMPHOCYTES # BLD AUTO: 1.52 K/UL
LYMPHOCYTES NFR BLD AUTO: 24.1 %
MAN DIFF?: NORMAL
MCHC RBC-ENTMCNC: 23.5 PG
MCHC RBC-ENTMCNC: 28.7 GM/DL
MCV RBC AUTO: 81.9 FL
MONOCYTES # BLD AUTO: 0.38 K/UL
MONOCYTES NFR BLD AUTO: 6 %
NEUTROPHILS # BLD AUTO: 4.31 K/UL
NEUTROPHILS NFR BLD AUTO: 68.2 %
PLATELET # BLD AUTO: 299 K/UL
POTASSIUM SERPL-SCNC: 4.4 MMOL/L
PROLACTIN SERPL-MCNC: 12.3 NG/ML
PROT SERPL-MCNC: 7.3 G/DL
RBC # BLD: 4.09 M/UL
RBC # FLD: 15.1 %
SODIUM SERPL-SCNC: 142 MMOL/L
TIBC SERPL-MCNC: 434 UG/DL
TRIGL SERPL-MCNC: 80 MG/DL
TSH SERPL-ACNC: 1.63 UIU/ML
UIBC SERPL-MCNC: 407 UG/DL
VIT B12 SERPL-MCNC: 213 PG/ML
WBC # FLD AUTO: 6.31 K/UL

## 2020-10-19 ENCOUNTER — APPOINTMENT (OUTPATIENT)
Dept: FAMILY MEDICINE | Facility: CLINIC | Age: 35
End: 2020-10-19

## 2020-10-20 ENCOUNTER — APPOINTMENT (OUTPATIENT)
Dept: FAMILY MEDICINE | Facility: CLINIC | Age: 35
End: 2020-10-20
Payer: MEDICAID

## 2020-10-20 VITALS
DIASTOLIC BLOOD PRESSURE: 82 MMHG | SYSTOLIC BLOOD PRESSURE: 138 MMHG | HEIGHT: 67 IN | HEART RATE: 74 BPM | WEIGHT: 293 LBS | BODY MASS INDEX: 45.99 KG/M2 | OXYGEN SATURATION: 99 %

## 2020-10-20 PROCEDURE — 99214 OFFICE O/P EST MOD 30 MIN: CPT | Mod: 25

## 2020-10-20 PROCEDURE — 99072 ADDL SUPL MATRL&STAF TM PHE: CPT

## 2020-10-20 PROCEDURE — 96372 THER/PROPH/DIAG INJ SC/IM: CPT

## 2020-10-20 RX ORDER — NITROFURANTOIN (MONOHYDRATE/MACROCRYSTALS) 25; 75 MG/1; MG/1
100 CAPSULE ORAL
Qty: 30 | Refills: 0 | Status: COMPLETED | COMMUNITY
Start: 2020-07-03

## 2020-10-20 NOTE — HISTORY OF PRESENT ILLNESS
[de-identified] : 34 year old female is here for a followup visit. Patient is here for medication renewals and for blood work discussion. Medications and allergies were reviewed and assessed.  There has been no new medications since the last visit. Patient is feeling well with no active changes or issues since Her last visit.\par \par patient feeling better, periods have become more regular - has not had vitamin b12 injections\par here for obesity management as well\par patient going to therapy - was diagnosed with depression and anxiety\par now on lexapro and is feeling better although its tired\par patients father  2016\par s/p gastric sleeve 2020 - lost 25 pounds\par

## 2020-10-20 NOTE — PHYSICAL EXAM
[Well Nourished] : well nourished [Normal Oropharynx] : the oropharynx was normal [Clear to Auscultation] : lungs were clear to auscultation bilaterally [Normal S1, S2] : normal S1 and S2 [Regular Rhythm] : with a regular rhythm [No Joint Swelling] : no joint swelling [Non Tender] : non-tender [Normal Gait] : normal gait [Normal Affect] : the affect was normal [Normal Insight/Judgement] : insight and judgment were intact [de-identified] : happier

## 2020-10-20 NOTE — ASSESSMENT
[FreeTextEntry1] : ANEMIA\par patient was vitamin b12 deficiency and severely anemic. patient with known anemia - \par birth control controlling her periods, feeling much better, less tired - since start of birth control\par injection given\par \par HYPERTENSION\par no longer needing meds\par \par OBESITY\par The diagnosis of obesity was discussed with the patient. The patient was counseled on diet and exercise. Patient was advised to eat a diet low in carbohydrates and low in fat with high protein diet with plenty of vegetables. Patient was counseled to eat small meals throughout the day avoiding carbohydrates, foods high in fat, foods that are fried and fast food.  Patient was advised to monitor fluid intake, to drink at least 8 glasses of pure water a day and reduce/stop intake of all sugar drinks including juice and soda. Patient was advised to try and exercise for 30-40 minutes per day for at least three days a week. Pros and cons of using medical management for weight loss versus diet/exercise alone was discussed. Medication options were provided for the patient and side affects and risks were identified and discussed.  Patient was advised to take any medications as prescribed and to call office or go to the ER immediately if any issues with the medications occur. All questions were answered.\par gastric sleeve 3/2020, lost 26 pounds\par \par ANXIETY\par Discussed diagnosis of anxiety and depression with the patient and potential outcomes/side affects of treatment versus non treatment. Medications were assessed and described at length. Side affects and black box warning were discussed.  Patient was advised to continue will all medications prescribed and the need for compliance was discussed and emphasized. Patient was advised to not stop medications without discussing with a health care provider first. Patient was advised to continue psychotherapy or seek therapy if not currently attending. Patient was educated on addictive potential of controlled substances and was counseled to use only as needed and sparingly. Patient verbalized understanding of all the above.\par always battling depression and reports that she is not enjoying her children and life\par patient in therapy - now on lexapro 10 - was doing much better, however now feeling like she would like an increase in her dosage\par \par \par

## 2020-10-20 NOTE — HEALTH RISK ASSESSMENT
[No falls in past year] : Patient reported no falls in the past year [0] : 2) Feeling down, depressed, or hopeless: Not at all (0) [Good] : ~his/her~  mood as  good [Patient reported PAP Smear was normal] : Patient reported PAP Smear was normal [None] : None [With Significant Other] : lives with significant other [With Family] : lives with family [Employed] : employed [# Of Children ___] : has [unfilled] children [] :  [Fully functional (bathing, dressing, toileting, transferring, walking, feeding)] : Fully functional (bathing, dressing, toileting, transferring, walking, feeding) [Fully functional (using the telephone, shopping, preparing meals, housekeeping, doing laundry, using] : Fully functional and needs no help or supervision to perform IADLs (using the telephone, shopping, preparing meals, housekeeping, doing laundry, using transportation, managing medications and managing finances) [Discussed at today's visit] : Advance Directives Discussed at today's visit [] : No [PapSmearDate] : 2017 [FreeTextEntry4] : none

## 2020-10-20 NOTE — REVIEW OF SYSTEMS
[Anxiety] : anxiety [Fatigue] : fatigue [Depression] : depression [Negative] : Neurological [de-identified] : improved

## 2020-12-01 ENCOUNTER — TRANSCRIPTION ENCOUNTER (OUTPATIENT)
Age: 35
End: 2020-12-01

## 2020-12-29 ENCOUNTER — TRANSCRIPTION ENCOUNTER (OUTPATIENT)
Age: 35
End: 2020-12-29

## 2021-01-30 ENCOUNTER — TRANSCRIPTION ENCOUNTER (OUTPATIENT)
Age: 36
End: 2021-01-30

## 2021-04-03 ENCOUNTER — TRANSCRIPTION ENCOUNTER (OUTPATIENT)
Age: 36
End: 2021-04-03

## 2021-04-05 ENCOUNTER — TRANSCRIPTION ENCOUNTER (OUTPATIENT)
Age: 36
End: 2021-04-05

## 2021-07-05 NOTE — CURRENT MEDS
Problem: Falls - Risk of  Goal: *Absence of Falls  Description: Document Connie Spare Fall Risk and appropriate interventions in the flowsheet. Outcome: Progressing Towards Goal  Note: Fall Risk Interventions:    Mentation Interventions: Adequate sleep, hydration, pain control    Medication Interventions: Teach patient to arise slowly       Problem: Pressure Injury - Risk of  Goal: *Prevention of pressure injury  Description: Document David Scale and appropriate interventions in the flowsheet.   Outcome: Progressing Towards Goal  Note: Pressure Injury Interventions:     Moisture Interventions: Absorbent underpads    Activity Interventions: Increase time out of bed    Mobility Interventions: PT/OT evaluation    Nutrition Interventions: Document food/fluid/supplement intake    Friction and Shear Interventions: HOB 30 degrees or less [Takes medication as prescribed] : takes [None] : Patient does not have any barriers to medication adherence

## 2021-08-11 ENCOUNTER — LABORATORY RESULT (OUTPATIENT)
Age: 36
End: 2021-08-11

## 2021-08-11 ENCOUNTER — APPOINTMENT (OUTPATIENT)
Dept: FAMILY MEDICINE | Facility: CLINIC | Age: 36
End: 2021-08-11
Payer: COMMERCIAL

## 2021-08-11 ENCOUNTER — TRANSCRIPTION ENCOUNTER (OUTPATIENT)
Age: 36
End: 2021-08-11

## 2021-08-11 VITALS
TEMPERATURE: 98.3 F | DIASTOLIC BLOOD PRESSURE: 80 MMHG | HEART RATE: 91 BPM | WEIGHT: 284 LBS | OXYGEN SATURATION: 97 % | SYSTOLIC BLOOD PRESSURE: 135 MMHG | HEIGHT: 67 IN | BODY MASS INDEX: 44.57 KG/M2

## 2021-08-11 DIAGNOSIS — Z00.00 ENCOUNTER FOR GENERAL ADULT MEDICAL EXAMINATION W/OUT ABNORMAL FINDINGS: ICD-10-CM

## 2021-08-11 PROCEDURE — 99214 OFFICE O/P EST MOD 30 MIN: CPT | Mod: 25

## 2021-08-11 PROCEDURE — 36415 COLL VENOUS BLD VENIPUNCTURE: CPT

## 2021-08-11 PROCEDURE — 99395 PREV VISIT EST AGE 18-39: CPT | Mod: 25

## 2021-08-11 RX ORDER — ESCITALOPRAM OXALATE 20 MG/1
20 TABLET ORAL
Qty: 90 | Refills: 1 | Status: DISCONTINUED | COMMUNITY
Start: 2018-10-15 | End: 2021-08-11

## 2021-08-11 NOTE — HISTORY OF PRESENT ILLNESS
[de-identified] : 35 year old female  here for annual well visit. Patient's blood work was drawn and medications reviewed. Patient's past medical history was reviewed, allergies verified and problems were identified and assessed. \par \par patient with depression/anxiety, no motivation\par here for obesity management as well\par patients father  2016\par s/p gastric sleeve 2020 - lost 25 pounds\par

## 2021-08-11 NOTE — HEALTH RISK ASSESSMENT
[Good] : ~his/her~  mood as  good [] : No [No falls in past year] : Patient reported no falls in the past year [0] : 2) Feeling down, depressed, or hopeless: Not at all (0) [Patient reported PAP Smear was normal] : Patient reported PAP Smear was normal [None] : None [With Significant Other] : lives with significant other [With Family] : lives with family [Employed] : employed [] :  [# Of Children ___] : has [unfilled] children [Fully functional (bathing, dressing, toileting, transferring, walking, feeding)] : Fully functional (bathing, dressing, toileting, transferring, walking, feeding) [Fully functional (using the telephone, shopping, preparing meals, housekeeping, doing laundry, using] : Fully functional and needs no help or supervision to perform IADLs (using the telephone, shopping, preparing meals, housekeeping, doing laundry, using transportation, managing medications and managing finances) [PapSmearDate] : 2017 [Discussed at today's visit] : Advance Directives Discussed at today's visit [FreeTextEntry4] : none

## 2021-08-11 NOTE — ASSESSMENT
[FreeTextEntry1] : ANEMIA\par patient was vitamin b12 deficiency and severely anemic. patient with known anemia - \par birth control controlling her periods, feeling much better, less tired - since start of birth control\par injection given\par \par HYPERTENSION\par no longer needing meds\par \par OBESITY\par The diagnosis of obesity was discussed with the patient. The patient was counseled on diet and exercise. Patient was advised to eat a diet low in carbohydrates and low in fat with high protein diet with plenty of vegetables. Patient was counseled to eat small meals throughout the day avoiding carbohydrates, foods high in fat, foods that are fried and fast food.  Patient was advised to monitor fluid intake, to drink at least 8 glasses of pure water a day and reduce/stop intake of all sugar drinks including juice and soda. Patient was advised to try and exercise for 30-40 minutes per day for at least three days a week. Pros and cons of using medical management for weight loss versus diet/exercise alone was discussed. Medication options were provided for the patient and side affects and risks were identified and discussed.  Patient was advised to take any medications as prescribed and to call office or go to the ER immediately if any issues with the medications occur. All questions were answered.\par gastric sleeve 3/2020, lost 50 pounds\par \par ANXIETY and DEPRESSION\par Discussed diagnosis of anxiety and depression with the patient and potential outcomes/side affects of treatment versus non treatment. Medications were assessed and described at length. Side affects and black box warning were discussed.  Patient was advised to continue will all medications prescribed and the need for compliance was discussed and emphasized. Patient was advised to not stop medications without discussing with a health care provider first. Patient was advised to continue psychotherapy or seek therapy if not currently attending. Patient was educated on addictive potential of controlled substances and was counseled to use only as needed and sparingly. Patient verbalized understanding of all the above.\par \par Patient is extremely depressed, OCD, no motivation, sits on the couch all day, does not do anything, reports house is a mess, doesn't clean the house, feels she has ADD as well\par always battling depression and reports that she is not enjoying her children and life\par severe anxiety as well with panic and she feels like its getting worse\par no longer going to therapy because does not like the therapists she has had\par \par will try wellbutrin\par if no improvement, consider zoloft\par \par \par \par ADD\par Discussed diagnosis of ADD with patient. Patient was assessed using verbal scale and in depth discussion of behaviors and how they are impacting daily life. Discussed all side affects/pros/cons of medications and black box warnings. Patient was educated on addictive potential of medications.  Patient verbalized understanding and will take medications as prescribed.  All questions were answered.\par patient reports she cannot focus or motivate\par

## 2021-08-11 NOTE — PHYSICAL EXAM
[Well Nourished] : well nourished [Well Developed] : well developed [Clear to Auscultation] : lungs were clear to auscultation bilaterally [Regular Rhythm] : with a regular rhythm [Normal S1, S2] : normal S1 and S2 [No Joint Swelling] : no joint swelling [Normal Gait] : normal gait [Normal Affect] : the affect was normal [Normal Insight/Judgement] : insight and judgment were intact [de-identified] : happier

## 2021-08-11 NOTE — REVIEW OF SYSTEMS
[Fatigue] : fatigue [Anxiety] : anxiety [Depression] : depression [Negative] : Heme/Lymph [de-identified] : severe

## 2021-08-12 LAB
25(OH)D3 SERPL-MCNC: 24.2 NG/ML
ALBUMIN SERPL ELPH-MCNC: 4.4 G/DL
ALP BLD-CCNC: 65 U/L
ALT SERPL-CCNC: 8 U/L
ANION GAP SERPL CALC-SCNC: 11 MMOL/L
APPEARANCE: CLEAR
AST SERPL-CCNC: 14 U/L
BASOPHILS # BLD AUTO: 0.03 K/UL
BASOPHILS NFR BLD AUTO: 0.6 %
BILIRUB SERPL-MCNC: 0.4 MG/DL
BILIRUBIN URINE: NEGATIVE
BLOOD URINE: NEGATIVE
BUN SERPL-MCNC: 7 MG/DL
CALCIUM SERPL-MCNC: 9.5 MG/DL
CHLORIDE SERPL-SCNC: 106 MMOL/L
CHOLEST SERPL-MCNC: 133 MG/DL
CO2 SERPL-SCNC: 24 MMOL/L
COLOR: YELLOW
CREAT SERPL-MCNC: 0.83 MG/DL
EOSINOPHIL # BLD AUTO: 0.08 K/UL
EOSINOPHIL NFR BLD AUTO: 1.7 %
ESTIMATED AVERAGE GLUCOSE: 97 MG/DL
FERRITIN SERPL-MCNC: 2 NG/ML
FOLATE SERPL-MCNC: 13.2 NG/ML
FSH SERPL-MCNC: 3.5 IU/L
GLUCOSE QUALITATIVE U: NEGATIVE
GLUCOSE SERPL-MCNC: 89 MG/DL
HBA1C MFR BLD HPLC: 5 %
HCG SERPL-MCNC: <1 MIU/ML
HCT VFR BLD CALC: 26.7 %
HDLC SERPL-MCNC: 43 MG/DL
HGB BLD-MCNC: 7 G/DL
IMM GRANULOCYTES NFR BLD AUTO: 0.4 %
INSULIN P FAST SERPL-ACNC: 16 UU/ML
IRON SATN MFR SERPL: 3 %
IRON SERPL-MCNC: 15 UG/DL
KETONES URINE: NEGATIVE
LDLC SERPL CALC-MCNC: 68 MG/DL
LEUKOCYTE ESTERASE URINE: ABNORMAL
LYMPHOCYTES # BLD AUTO: 0.88 K/UL
LYMPHOCYTES NFR BLD AUTO: 18.6 %
MAN DIFF?: NORMAL
MCHC RBC-ENTMCNC: 18.4 PG
MCHC RBC-ENTMCNC: 26.2 GM/DL
MCV RBC AUTO: 70.3 FL
MONOCYTES # BLD AUTO: 0.3 K/UL
MONOCYTES NFR BLD AUTO: 6.3 %
NEUTROPHILS # BLD AUTO: 3.43 K/UL
NEUTROPHILS NFR BLD AUTO: 72.4 %
NITRITE URINE: NEGATIVE
NONHDLC SERPL-MCNC: 90 MG/DL
PH URINE: 6
PLATELET # BLD AUTO: 313 K/UL
POTASSIUM SERPL-SCNC: 4.5 MMOL/L
PROT SERPL-MCNC: 7.5 G/DL
PROTEIN URINE: NORMAL
RBC # BLD: 3.8 M/UL
RBC # FLD: 19.5 %
SODIUM SERPL-SCNC: 142 MMOL/L
SPECIFIC GRAVITY URINE: 1.02
TESTOST SERPL-MCNC: 18.1 NG/DL
THYROGLOB AB SERPL-ACNC: <20 IU/ML
THYROPEROXIDASE AB SERPL IA-ACNC: <10 IU/ML
TIBC SERPL-MCNC: 463 UG/DL
TRIGL SERPL-MCNC: 106 MG/DL
TSH SERPL-ACNC: 2.77 UIU/ML
UIBC SERPL-MCNC: 448 UG/DL
UROBILINOGEN URINE: NORMAL
VIT B12 SERPL-MCNC: 188 PG/ML
WBC # FLD AUTO: 4.74 K/UL

## 2021-08-13 DIAGNOSIS — N39.0 URINARY TRACT INFECTION, SITE NOT SPECIFIED: ICD-10-CM

## 2021-08-14 LAB — TSI ACT/NOR SER: <0.1 IU/L

## 2021-08-19 LAB — ESTROGEN SERPL-MCNC: 792 PG/ML

## 2021-10-12 ENCOUNTER — LABORATORY RESULT (OUTPATIENT)
Age: 36
End: 2021-10-12

## 2021-10-12 ENCOUNTER — APPOINTMENT (OUTPATIENT)
Dept: FAMILY MEDICINE | Facility: CLINIC | Age: 36
End: 2021-10-12
Payer: COMMERCIAL

## 2021-10-12 ENCOUNTER — APPOINTMENT (OUTPATIENT)
Dept: ENDOCRINOLOGY | Facility: CLINIC | Age: 36
End: 2021-10-12
Payer: COMMERCIAL

## 2021-10-12 VITALS
DIASTOLIC BLOOD PRESSURE: 82 MMHG | WEIGHT: 282 LBS | OXYGEN SATURATION: 98 % | HEIGHT: 67 IN | SYSTOLIC BLOOD PRESSURE: 140 MMHG | BODY MASS INDEX: 44.26 KG/M2 | HEART RATE: 102 BPM | TEMPERATURE: 98.3 F | RESPIRATION RATE: 17 BRPM

## 2021-10-12 VITALS
HEART RATE: 92 BPM | OXYGEN SATURATION: 99 % | DIASTOLIC BLOOD PRESSURE: 82 MMHG | WEIGHT: 282 LBS | SYSTOLIC BLOOD PRESSURE: 128 MMHG | TEMPERATURE: 98.3 F | BODY MASS INDEX: 44.26 KG/M2 | HEIGHT: 67 IN

## 2021-10-12 DIAGNOSIS — E66.9 OBESITY, UNSPECIFIED: ICD-10-CM

## 2021-10-12 DIAGNOSIS — F41.8 OTHER SPECIFIED ANXIETY DISORDERS: ICD-10-CM

## 2021-10-12 DIAGNOSIS — E53.8 DEFICIENCY OF OTHER SPECIFIED B GROUP VITAMINS: ICD-10-CM

## 2021-10-12 LAB — 25(OH)D3 SERPL-MCNC: 34.1 NG/ML

## 2021-10-12 PROCEDURE — 99214 OFFICE O/P EST MOD 30 MIN: CPT

## 2021-10-12 PROCEDURE — 99214 OFFICE O/P EST MOD 30 MIN: CPT | Mod: 25

## 2021-10-12 PROCEDURE — 36415 COLL VENOUS BLD VENIPUNCTURE: CPT

## 2021-10-12 RX ORDER — ESCITALOPRAM OXALATE 10 MG/1
10 TABLET ORAL
Qty: 30 | Refills: 3 | Status: ACTIVE | COMMUNITY
Start: 2021-10-12 | End: 1900-01-01

## 2021-10-12 RX ORDER — CIPROFLOXACIN HYDROCHLORIDE 500 MG/1
500 TABLET, FILM COATED ORAL
Qty: 10 | Refills: 0 | Status: DISCONTINUED | COMMUNITY
Start: 2021-08-13 | End: 2021-10-12

## 2021-10-12 NOTE — HISTORY OF PRESENT ILLNESS
[de-identified] : 35 year old female is here for a followup visit. Patient is here for medication renewals and for blood work discussion. Medications and allergies were reviewed and assessed.  There has been no new medications since the last visit. Patient is feeling well with no active changes or issues since Her last visit.\par \par \par patient with depression/anxiety, no motivation\par here for obesity management as well\par patients father  2016\par s/p gastric sleeve 2020 - lost 25 pounds\par

## 2021-10-12 NOTE — ASSESSMENT
[FreeTextEntry1] : 1. Possible underlying PCOS\par - fasting insulin is inappropriate for glucose levels. prev d/w starting metformin, but patient prefers holding for now\par \par 2. Obesity\par s/p sleeve with some weight loss\par - Current approaches to weight management are discussed with the patient. \par Suggested extensive nutritional education program. Proper dietary restrictions and exercise routines discussed. \par Medical weight loss therapies were reviewed with the patient. - \par - advised on interactions b.w some of wt loss meds and SSRI's. prev failed phentermine\par - will try Saxenda. R+B. Advised on c/ception\par \par RTC 3 months\par \par \par

## 2021-10-12 NOTE — HISTORY OF PRESENT ILLNESS
[FreeTextEntry1] : 35 year  female f/u for weight management\par \par *** Oct 12, 2021 ***\par \par last visit more than 2 yrs ago\par s/p sleeve gastrectomy in 03/20 ([facility]). Lost only 40 lbs since the sx. \par not on only weight loss meds. Failed phentermine. off OCPs since the sx. Cycle improved post sx\par taking wellbutrin and had lexapro added today for her depression\par \par *** July 01, 2019 ***\par \par 24h UFC- 18\par TSH- 2.1\par neg tpo/tg ab\par B12- 250\par 25D- 22\par testo- 24.2. normal 17ohP/Preg\par fasting glu- 77 with insulin 14\par \par HPI:\par She was struggling with her weight since her childhood. She gained about 100 lbs in her senior year of high school, and had continuously kept gaining weight since then. She was diagnosed with mild hypothyroidism during her fertility evaluation about 9 years ago, and was on L-thyroxine replacement for about a year. Since then her thyroid levels were fine, and she never required treatment with her second pregnancy. She was also diagnosed with a prediabetes at that time , and was briefly on metformin. \par complains of  irregular menses x many years\par Menarche at 12 yo. \par LMP - currently now. \par She is on OCP's right now, and also taking phentermine; however is not successful on it. She tried weight watchers and other physician supervised weight loss program, which did not result in any meaningful weight loss.\par Had  difficulties getting pregnant , and undergone fertility treatment\par Miscarriages - none\par +facial acne \par Denies increased facial/body hair growth, but does complain of scalp hair thinning\par Denies: breast discharge, shoe/ring size change, easy bruising or new purple stretch marks on the abdomen/thighs. \par Last gyn exam- 2018\par Pelvic US - 2018, reports normal.\par Last TSH- 3.54, a1c- 5.4\par

## 2021-10-12 NOTE — REVIEW OF SYSTEMS
[Fatigue] : fatigue [Anxiety] : anxiety [Depression] : depression [Negative] : Heme/Lymph [de-identified] : severe

## 2021-10-12 NOTE — PHYSICAL EXAM
[Well Nourished] : well nourished [Well Developed] : well developed [Clear to Auscultation] : lungs were clear to auscultation bilaterally [Regular Rhythm] : with a regular rhythm [Normal S1, S2] : normal S1 and S2 [No Joint Swelling] : no joint swelling [Normal Gait] : normal gait [Normal Affect] : the affect was normal [Normal Insight/Judgement] : insight and judgment were intact [de-identified] : happier

## 2021-10-12 NOTE — HEALTH RISK ASSESSMENT
[Very Good] : ~his/her~  mood as very good [] : No [No falls in past year] : Patient reported no falls in the past year [0] : 2) Feeling down, depressed, or hopeless: Not at all (0) [PHQ-2 Negative - No further assessment needed] : PHQ-2 Negative - No further assessment needed [[id number]] : 0 [Patient reported PAP Smear was normal] : Patient reported PAP Smear was normal [None] : None [With Significant Other] : lives with significant other [With Family] : lives with family [Employed] : employed [] :  [# Of Children ___] : has [unfilled] children [Fully functional (bathing, dressing, toileting, transferring, walking, feeding)] : Fully functional (bathing, dressing, toileting, transferring, walking, feeding) [Fully functional (using the telephone, shopping, preparing meals, housekeeping, doing laundry, using] : Fully functional and needs no help or supervision to perform IADLs (using the telephone, shopping, preparing meals, housekeeping, doing laundry, using transportation, managing medications and managing finances) [PapSmearDate] : 2017 [Discussed at today's visit] : Advance Directives Discussed at today's visit [FreeTextEntry4] : none

## 2021-10-12 NOTE — ASSESSMENT
[FreeTextEntry1] : ANEMIA\par patient was vitamin b12 deficiency and severely anemic. patient with known anemia - \par birth control controlling her periods, feeling much better, less tired - since start of birth control\par injection given\par \par HYPERTENSION\par no longer needing meds\par \par OBESITY\par The diagnosis of obesity was discussed with the patient. The patient was counseled on diet and exercise. Patient was advised to eat a diet low in carbohydrates and low in fat with high protein diet with plenty of vegetables. Patient was counseled to eat small meals throughout the day avoiding carbohydrates, foods high in fat, foods that are fried and fast food.  Patient was advised to monitor fluid intake, to drink at least 8 glasses of pure water a day and reduce/stop intake of all sugar drinks including juice and soda. Patient was advised to try and exercise for 30-40 minutes per day for at least three days a week. Pros and cons of using medical management for weight loss versus diet/exercise alone was discussed. Medication options were provided for the patient and side affects and risks were identified and discussed.  Patient was advised to take any medications as prescribed and to call office or go to the ER immediately if any issues with the medications occur. All questions were answered.\par gastric sleeve 3/2020, lost 50 pounds\par \par ANXIETY and DEPRESSION\par Discussed diagnosis of anxiety and depression with the patient and potential outcomes/side affects of treatment versus non treatment. Medications were assessed and described at length. Side affects and black box warning were discussed.  Patient was advised to continue will all medications prescribed and the need for compliance was discussed and emphasized. Patient was advised to not stop medications without discussing with a health care provider first. Patient was advised to continue psychotherapy or seek therapy if not currently attending. Patient was educated on addictive potential of controlled substances and was counseled to use only as needed and sparingly. Patient verbalized understanding of all the above.\par \par Patient is extremely depressed, OCD, no motivation, sits on the couch all day, does not do anything, reports house is a mess, doesn't clean the house, feels she has ADD as well\par always battling depression and reports that she is not enjoying her children and life\par severe anxiety as well with panic and she feels like its getting worse\par no longer going to therapy because does not like the therapists she has had\par \par now on wellbutrin, feels she is getting angry faster, has more energy, has a little more energy and motivation, but not enough, enjoying life a little more, sister sees a difference, less mopey\par \par was on lexapro in the past, will try adding back on lexapro\par \par \par ADD\par Discussed diagnosis of ADD with patient. Patient was assessed using verbal scale and in depth discussion of behaviors and how they are impacting daily life. Discussed all side affects/pros/cons of medications and black box warnings. Patient was educated on addictive potential of medications.  Patient verbalized understanding and will take medications as prescribed.  All questions were answered.\par patient reports she cannot focus or motivate\par

## 2021-10-13 DIAGNOSIS — D64.9 ANEMIA, UNSPECIFIED: ICD-10-CM

## 2021-10-13 LAB
ALBUMIN SERPL ELPH-MCNC: 4.4 G/DL
ALP BLD-CCNC: 74 U/L
ALT SERPL-CCNC: 15 U/L
ANION GAP SERPL CALC-SCNC: 12 MMOL/L
AST SERPL-CCNC: 18 U/L
BASOPHILS # BLD AUTO: 0.02 K/UL
BASOPHILS NFR BLD AUTO: 0.3 %
BILIRUB SERPL-MCNC: 0.2 MG/DL
BUN SERPL-MCNC: 12 MG/DL
CALCIUM SERPL-MCNC: 9.2 MG/DL
CHLORIDE SERPL-SCNC: 105 MMOL/L
CHOLEST SERPL-MCNC: 138 MG/DL
CO2 SERPL-SCNC: 25 MMOL/L
CREAT SERPL-MCNC: 0.81 MG/DL
EOSINOPHIL # BLD AUTO: 0.05 K/UL
EOSINOPHIL NFR BLD AUTO: 0.9 %
ESTIMATED AVERAGE GLUCOSE: 94 MG/DL
FERRITIN SERPL-MCNC: 7 NG/ML
FOLATE SERPL-MCNC: 14.9 NG/ML
GLUCOSE SERPL-MCNC: 77 MG/DL
HBA1C MFR BLD HPLC: 4.9 %
HCT VFR BLD CALC: 32.2 %
HDLC SERPL-MCNC: 42 MG/DL
HGB BLD-MCNC: 9.1 G/DL
IMM GRANULOCYTES NFR BLD AUTO: 0.2 %
IRON SATN MFR SERPL: 88 %
IRON SERPL-MCNC: 380 UG/DL
LDLC SERPL CALC-MCNC: 75 MG/DL
LYMPHOCYTES # BLD AUTO: 0.98 K/UL
LYMPHOCYTES NFR BLD AUTO: 17.1 %
MAN DIFF?: NORMAL
MCHC RBC-ENTMCNC: 21.7 PG
MCHC RBC-ENTMCNC: 28.3 GM/DL
MCV RBC AUTO: 76.8 FL
MONOCYTES # BLD AUTO: 0.39 K/UL
MONOCYTES NFR BLD AUTO: 6.8 %
NEUTROPHILS # BLD AUTO: 4.27 K/UL
NEUTROPHILS NFR BLD AUTO: 74.7 %
NONHDLC SERPL-MCNC: 96 MG/DL
PLATELET # BLD AUTO: 285 K/UL
POTASSIUM SERPL-SCNC: 4.4 MMOL/L
PROT SERPL-MCNC: 7.5 G/DL
RBC # BLD: 4.19 M/UL
RBC # FLD: 20.6 %
SODIUM SERPL-SCNC: 142 MMOL/L
T4 FREE SERPL-MCNC: 1.1 NG/DL
TIBC SERPL-MCNC: 434 UG/DL
TRIGL SERPL-MCNC: 107 MG/DL
TSH SERPL-ACNC: 2.02 UIU/ML
UIBC SERPL-MCNC: 54 UG/DL
VIT B12 SERPL-MCNC: 656 PG/ML
WBC # FLD AUTO: 5.72 K/UL

## 2021-10-22 LAB — VIT B1 SERPL-MCNC: 102.8 NMOL/L

## 2021-10-25 ENCOUNTER — TRANSCRIPTION ENCOUNTER (OUTPATIENT)
Age: 36
End: 2021-10-25

## 2021-11-02 ENCOUNTER — APPOINTMENT (OUTPATIENT)
Dept: ENDOCRINOLOGY | Facility: CLINIC | Age: 36
End: 2021-11-02

## 2021-11-04 ENCOUNTER — TRANSCRIPTION ENCOUNTER (OUTPATIENT)
Age: 36
End: 2021-11-04

## 2021-11-11 ENCOUNTER — TRANSCRIPTION ENCOUNTER (OUTPATIENT)
Age: 36
End: 2021-11-11

## 2022-01-10 ENCOUNTER — APPOINTMENT (OUTPATIENT)
Dept: ENDOCRINOLOGY | Facility: CLINIC | Age: 37
End: 2022-01-10
Payer: COMMERCIAL

## 2022-01-10 VITALS
RESPIRATION RATE: 17 BRPM | WEIGHT: 259 LBS | SYSTOLIC BLOOD PRESSURE: 130 MMHG | HEIGHT: 67 IN | TEMPERATURE: 97.7 F | HEART RATE: 89 BPM | DIASTOLIC BLOOD PRESSURE: 80 MMHG | BODY MASS INDEX: 40.65 KG/M2 | OXYGEN SATURATION: 97 %

## 2022-01-10 PROCEDURE — 99214 OFFICE O/P EST MOD 30 MIN: CPT | Mod: 25

## 2022-01-10 PROCEDURE — 36415 COLL VENOUS BLD VENIPUNCTURE: CPT

## 2022-01-10 NOTE — HISTORY OF PRESENT ILLNESS
[FreeTextEntry1] : 36 year  female f/u for weight management\par \par *** Haroon 10, 2022 ***\par \par taking Saxenda 3 mg qd. Lost 23 lbs in about 2 months. on the maximum dose about 2 weeks- with some  nausea\par otherwise, feels well and is very pleased with a weight loss\par not using CPAP machine, but is still waking up at nights frequently-  exhausted during the day\par \par *** Oct 12, 2021 ***\par \par last visit more than 2 yrs ago\par s/p sleeve gastrectomy in 03/20 ([facility]). Lost only 40 lbs since the sx. \par not on only weight loss meds. Failed phentermine. off OCPs since the sx. Cycle improved post sx\par taking wellbutrin and had lexapro added today for her depression\par \par *** July 01, 2019 ***\par \par 24h UFC- 18\par TSH- 2.1\par neg tpo/tg ab\par B12- 250\par 25D- 22\par testo- 24.2. normal 17ohP/Preg\par fasting glu- 77 with insulin 14\par \par HPI:\par She was struggling with her weight since her childhood. She gained about 100 lbs in her senior year of high school, and had continuously kept gaining weight since then. She was diagnosed with mild hypothyroidism during her fertility evaluation about 9 years ago, and was on L-thyroxine replacement for about a year. Since then her thyroid levels were fine, and she never required treatment with her second pregnancy. She was also diagnosed with a prediabetes at that time , and was briefly on metformin. \par complains of  irregular menses x many years\par Menarche at 12 yo. \par LMP - currently now. \par She is on OCP's right now, and also taking phentermine; however is not successful on it. She tried weight watchers and other physician supervised weight loss program, which did not result in any meaningful weight loss.\par Had  difficulties getting pregnant , and undergone fertility treatment\par Miscarriages - none\par +facial acne \par Denies increased facial/body hair growth, but does complain of scalp hair thinning\par Denies: breast discharge, shoe/ring size change, easy bruising or new purple stretch marks on the abdomen/thighs. \par Last gyn exam- 2018\par Pelvic US - 2018, reports normal.\par Last TSH- 3.54, a1c- 5.4\par

## 2022-01-10 NOTE — ASSESSMENT
[FreeTextEntry1] : 1. Possible underlying PCOS\par - fasting insulin is inappropriate for glucose levels. prev d/w starting metformin, but patient prefers holding for now\par \par 2. Obesity\par s/p sleeve with some weight loss\par - Current approaches to weight management are discussed with the patient. \par Suggested extensive nutritional education program. Proper dietary restrictions and exercise routines discussed. \par Medical weight loss therapies were reviewed with the patient. - \par - advised on interactions b.w some of wt loss meds and SSRI's. prev failed phentermine\par - successful on  Saxenda. R+B. Advised on c/ception. can lower to  2.4 mg qd and slowly titrate up if needed\par - dietary changes and incorporate stationary bike, weight lifting\par - resume CPAP and f/u with pulmonary. \par \par RTC 3 months\par \par \par

## 2022-01-14 LAB
25(OH)D3 SERPL-MCNC: 19.3 NG/ML
ALBUMIN SERPL ELPH-MCNC: 4.4 G/DL
ALP BLD-CCNC: 68 U/L
ALT SERPL-CCNC: 8 U/L
ANION GAP SERPL CALC-SCNC: 12 MMOL/L
AST SERPL-CCNC: 14 U/L
BASOPHILS # BLD AUTO: 0.03 K/UL
BASOPHILS NFR BLD AUTO: 0.6 %
BILIRUB SERPL-MCNC: 0.4 MG/DL
BUN SERPL-MCNC: 8 MG/DL
CALCIUM SERPL-MCNC: 9.5 MG/DL
CHLORIDE SERPL-SCNC: 105 MMOL/L
CHOLEST SERPL-MCNC: 119 MG/DL
CO2 SERPL-SCNC: 24 MMOL/L
CREAT SERPL-MCNC: 0.84 MG/DL
EOSINOPHIL # BLD AUTO: 0.04 K/UL
EOSINOPHIL NFR BLD AUTO: 0.9 %
ESTIMATED AVERAGE GLUCOSE: 97 MG/DL
FRUCTOSAMINE SERPL-MCNC: 195 UMOL/L
GLUCOSE SERPL-MCNC: 73 MG/DL
HBA1C MFR BLD HPLC: 5 %
HCT VFR BLD CALC: 30.3 %
HDLC SERPL-MCNC: 42 MG/DL
HGB BLD-MCNC: 8.1 G/DL
IMM GRANULOCYTES NFR BLD AUTO: 0.6 %
IRON SATN MFR SERPL: 11 %
IRON SERPL-MCNC: 47 UG/DL
LDLC SERPL CALC-MCNC: 59 MG/DL
LYMPHOCYTES # BLD AUTO: 1.07 K/UL
LYMPHOCYTES NFR BLD AUTO: 23 %
MAN DIFF?: NORMAL
MCHC RBC-ENTMCNC: 21 PG
MCHC RBC-ENTMCNC: 26.7 GM/DL
MCV RBC AUTO: 78.5 FL
MONOCYTES # BLD AUTO: 0.32 K/UL
MONOCYTES NFR BLD AUTO: 6.9 %
NEUTROPHILS # BLD AUTO: 3.16 K/UL
NEUTROPHILS NFR BLD AUTO: 68 %
NONHDLC SERPL-MCNC: 77 MG/DL
PLATELET # BLD AUTO: 332 K/UL
POTASSIUM SERPL-SCNC: 4.5 MMOL/L
PROT SERPL-MCNC: 7.4 G/DL
RBC # BLD: 3.86 M/UL
RBC # FLD: 18.4 %
SODIUM SERPL-SCNC: 141 MMOL/L
T4 FREE SERPL-MCNC: 1.1 NG/DL
TIBC SERPL-MCNC: 437 UG/DL
TRIGL SERPL-MCNC: 89 MG/DL
TSH SERPL-ACNC: 2.37 UIU/ML
UIBC SERPL-MCNC: 390 UG/DL
WBC # FLD AUTO: 4.65 K/UL

## 2022-01-26 ENCOUNTER — RX RENEWAL (OUTPATIENT)
Age: 37
End: 2022-01-26

## 2022-01-26 ENCOUNTER — TRANSCRIPTION ENCOUNTER (OUTPATIENT)
Age: 37
End: 2022-01-26

## 2022-01-26 RX ORDER — BUPROPION HYDROCHLORIDE 300 MG/1
300 TABLET, EXTENDED RELEASE ORAL
Qty: 30 | Refills: 1 | Status: ACTIVE | COMMUNITY
Start: 2021-08-11 | End: 1900-01-01

## 2022-05-03 ENCOUNTER — APPOINTMENT (OUTPATIENT)
Dept: ENDOCRINOLOGY | Facility: CLINIC | Age: 37
End: 2022-05-03
Payer: COMMERCIAL

## 2022-05-03 ENCOUNTER — LABORATORY RESULT (OUTPATIENT)
Age: 37
End: 2022-05-03

## 2022-05-03 VITALS
BODY MASS INDEX: 40.81 KG/M2 | SYSTOLIC BLOOD PRESSURE: 117 MMHG | DIASTOLIC BLOOD PRESSURE: 70 MMHG | HEART RATE: 85 BPM | HEIGHT: 67 IN | WEIGHT: 260 LBS | OXYGEN SATURATION: 99 % | TEMPERATURE: 97.8 F

## 2022-05-03 DIAGNOSIS — R53.83 OTHER MALAISE: ICD-10-CM

## 2022-05-03 DIAGNOSIS — R53.81 OTHER MALAISE: ICD-10-CM

## 2022-05-03 DIAGNOSIS — D50.9 IRON DEFICIENCY ANEMIA, UNSPECIFIED: ICD-10-CM

## 2022-05-03 PROCEDURE — 36415 COLL VENOUS BLD VENIPUNCTURE: CPT

## 2022-05-03 PROCEDURE — 99214 OFFICE O/P EST MOD 30 MIN: CPT | Mod: 25

## 2022-05-03 NOTE — ASSESSMENT
[FreeTextEntry1] : 1. Possible underlying PCOS\par - fasting insulin is inappropriate for glucose levels. prev d/w starting metformin, but patient prefers holding for now\par \par 2. Obesity\par s/p sleeve with some weight loss\par - Current approaches to weight management are discussed with the patient. \par Suggested extensive nutritional education program. Proper dietary restrictions and exercise routines discussed. \par Medical weight loss therapies were reviewed with the patient. - \par - advised on interactions b.w some of wt loss meds and SSRI's. prev failed phentermine\par - plateaued on saxenda - will try Wegovy (R+B). Advised on c/ception. \par - dietary changes and incorporate stationary bike, weight lifting\par - resume CPAP and f/u with pulmonary. \par \par RTC 3 months\par \par \par

## 2022-05-03 NOTE — HISTORY OF PRESENT ILLNESS
[FreeTextEntry1] : 36 year  female f/u for weight management\par \par *** May 03, 2022 ***\par \par was on Saxenda 3 mg , but did not lose more weight. Traveled to [place]- was off Saxenda there, on the return developed a travelers diarrhea and overall was off Saxenda x 3 weeks. Resumed a smaller dose now, still no results\par otherwise  feels ok, still tired \par did not see gyn or hem/onc yet\par \par *** Haroon 10, 2022 ***\par \par taking Saxenda 3 mg qd. Lost 23 lbs in about 2 months. on the maximum dose about 2 weeks- with some  nausea\par otherwise, feels well and is very pleased with a weight loss\par not using CPAP machine, but is still waking up at nights frequently-  exhausted during the day\par \par *** Oct 12, 2021 ***\par \par last visit more than 2 yrs ago\par s/p sleeve gastrectomy in 03/20 ([facility]). Lost only 40 lbs since the sx. \par not on only weight loss meds. Failed phentermine. off OCPs since the sx. Cycle improved post sx\par taking wellbutrin and had lexapro added today for her depression\par \par *** July 01, 2019 ***\par \par 24h UFC- 18\par TSH- 2.1\par neg tpo/tg ab\par B12- 250\par 25D- 22\par testo- 24.2. normal 17ohP/Preg\par fasting glu- 77 with insulin 14\par \par HPI:\par She was struggling with her weight since her childhood. She gained about 100 lbs in her senior year of high school, and had continuously kept gaining weight since then. She was diagnosed with mild hypothyroidism during her fertility evaluation about 9 years ago, and was on L-thyroxine replacement for about a year. Since then her thyroid levels were fine, and she never required treatment with her second pregnancy. She was also diagnosed with a prediabetes at that time , and was briefly on metformin. \par complains of  irregular menses x many years\par Menarche at 14 yo. \par LMP - currently now. \par She is on OCP's right now, and also taking phentermine; however is not successful on it. She tried weight watchers and other physician supervised weight loss program, which did not result in any meaningful weight loss.\par Had  difficulties getting pregnant , and undergone fertility treatment\par Miscarriages - none\par +facial acne \par Denies increased facial/body hair growth, but does complain of scalp hair thinning\par Denies: breast discharge, shoe/ring size change, easy bruising or new purple stretch marks on the abdomen/thighs. \par Last gyn exam- 2018\par Pelvic US - 2018, reports normal.\par Last TSH- 3.54, a1c- 5.4\par

## 2022-05-09 LAB
25(OH)D3 SERPL-MCNC: 14.6 NG/ML
ALBUMIN SERPL ELPH-MCNC: 4.3 G/DL
ALP BLD-CCNC: 76 U/L
ALT SERPL-CCNC: 30 U/L
ANION GAP SERPL CALC-SCNC: 13 MMOL/L
AST SERPL-CCNC: 23 U/L
BASOPHILS # BLD AUTO: 0.02 K/UL
BASOPHILS NFR BLD AUTO: 0.4 %
BILIRUB SERPL-MCNC: 0.4 MG/DL
BUN SERPL-MCNC: 7 MG/DL
CALCIUM SERPL-MCNC: 9.4 MG/DL
CHLORIDE SERPL-SCNC: 105 MMOL/L
CO2 SERPL-SCNC: 25 MMOL/L
CREAT SERPL-MCNC: 0.75 MG/DL
EGFR: 106 ML/MIN/1.73M2
EOSINOPHIL # BLD AUTO: 0.03 K/UL
EOSINOPHIL NFR BLD AUTO: 0.5 %
ESTIMATED AVERAGE GLUCOSE: 111 MG/DL
FERRITIN SERPL-MCNC: 2 NG/ML
GLUCOSE SERPL-MCNC: 79 MG/DL
HBA1C MFR BLD HPLC: 5.5 %
HCT VFR BLD CALC: 28.2 %
HGB BLD-MCNC: 7.9 G/DL
IMM GRANULOCYTES NFR BLD AUTO: 0.2 %
IRON SATN MFR SERPL: 4 %
IRON SERPL-MCNC: 17 UG/DL
LYMPHOCYTES # BLD AUTO: 1.03 K/UL
LYMPHOCYTES NFR BLD AUTO: 18.8 %
MAN DIFF?: NORMAL
MCHC RBC-ENTMCNC: 20.6 PG
MCHC RBC-ENTMCNC: 28 GM/DL
MCV RBC AUTO: 73.4 FL
MONOCYTES # BLD AUTO: 0.38 K/UL
MONOCYTES NFR BLD AUTO: 6.9 %
NEUTROPHILS # BLD AUTO: 4.02 K/UL
NEUTROPHILS NFR BLD AUTO: 73.2 %
PLATELET # BLD AUTO: 334 K/UL
POTASSIUM SERPL-SCNC: 4.5 MMOL/L
PROT SERPL-MCNC: 7 G/DL
RBC # BLD: 3.84 M/UL
RBC # FLD: 16.7 %
SODIUM SERPL-SCNC: 142 MMOL/L
T4 FREE SERPL-MCNC: 1.1 NG/DL
TIBC SERPL-MCNC: 452 UG/DL
TSH SERPL-ACNC: 1.9 UIU/ML
UIBC SERPL-MCNC: 434 UG/DL
WBC # FLD AUTO: 5.49 K/UL

## 2022-05-09 RX ORDER — FERROUS SULFATE 324(65)MG
324 TABLET, DELAYED RELEASE (ENTERIC COATED) ORAL
Qty: 60 | Refills: 3 | Status: ACTIVE | COMMUNITY
Start: 2022-05-09 | End: 1900-01-01

## 2022-09-13 ENCOUNTER — APPOINTMENT (OUTPATIENT)
Dept: ENDOCRINOLOGY | Facility: CLINIC | Age: 37
End: 2022-09-13
Payer: COMMERCIAL

## 2022-09-13 VITALS
OXYGEN SATURATION: 99 % | HEART RATE: 74 BPM | SYSTOLIC BLOOD PRESSURE: 122 MMHG | RESPIRATION RATE: 17 BRPM | HEIGHT: 67 IN | BODY MASS INDEX: 42.06 KG/M2 | DIASTOLIC BLOOD PRESSURE: 70 MMHG | WEIGHT: 268 LBS | TEMPERATURE: 98 F

## 2022-09-13 PROCEDURE — 36415 COLL VENOUS BLD VENIPUNCTURE: CPT

## 2022-09-13 PROCEDURE — 99214 OFFICE O/P EST MOD 30 MIN: CPT | Mod: 25

## 2022-09-13 NOTE — ASSESSMENT
[FreeTextEntry1] : 1. Possible underlying PCOS\par - fasting insulin is inappropriate for glucose levels. prev d/w starting metformin, but patient prefers holding for now\par \par 2. Obesity\par s/p sleeve with some weight loss\par - Current approaches to weight management are discussed with the patient. \par Suggested extensive nutritional education program. Proper dietary restrictions and exercise routines discussed. \par Medical weight loss therapies were reviewed with the patient. - \par - advised on interactions b.w some of wt loss meds and SSRI's. prev failed phentermine\par - plateaued on saxenda - low doses of Wegovy are still on backorder.  Will give samples of ozempic (R+B), and if successful will switch to Wegovy . Advised on c/ception. \par - dietary changes and incorporate stationary bike, weight lifting\par - resume CPAP and f/u with pulmonary. \par \par RTC 3 months\par \par \par

## 2022-09-13 NOTE — HISTORY OF PRESENT ILLNESS
[FreeTextEntry1] : 36 year  female f/u for weight management\par \par *** Sep 13, 2022 ***\par \par did not start wegovy. was not ready for it first, then the medication was on backorder\par had lapse with the insurance, was more depressed- recently went back on wellbutrin and lexapro\par upset about her weight\par did not see hem/onc yet\par \par *** May 03, 2022 ***\par \par was on Saxenda 3 mg , but did not lose more weight. Traveled to [place]- was off Saxenda there, on the return developed a travelers diarrhea and overall was off Saxenda x 3 weeks. Resumed a smaller dose now, still no results\par otherwise  feels ok, still tired \par did not see gyn or hem/onc yet\par \par *** Haroon 10, 2022 ***\par \par taking Saxenda 3 mg qd. Lost 23 lbs in about 2 months. on the maximum dose about 2 weeks- with some  nausea\par otherwise, feels well and is very pleased with a weight loss\par not using CPAP machine, but is still waking up at nights frequently-  exhausted during the day\par \par *** Oct 12, 2021 ***\par \par last visit more than 2 yrs ago\par s/p sleeve gastrectomy in 03/20 ([facility]). Lost only 40 lbs since the sx. \par not on only weight loss meds. Failed phentermine. off OCPs since the sx. Cycle improved post sx\par taking wellbutrin and had lexapro added today for her depression\par \par *** July 01, 2019 ***\par \par 24h UFC- 18\par TSH- 2.1\par neg tpo/tg ab\par B12- 250\par 25D- 22\par testo- 24.2. normal 17ohP/Preg\par fasting glu- 77 with insulin 14\par \par HPI:\par She was struggling with her weight since her childhood. She gained about 100 lbs in her senior year of high school, and had continuously kept gaining weight since then. She was diagnosed with mild hypothyroidism during her fertility evaluation about 9 years ago, and was on L-thyroxine replacement for about a year. Since then her thyroid levels were fine, and she never required treatment with her second pregnancy. She was also diagnosed with a prediabetes at that time , and was briefly on metformin. \par complains of  irregular menses x many years\par Menarche at 12 yo. \par LMP - currently now. \par She is on OCP's right now, and also taking phentermine; however is not successful on it. She tried weight watchers and other physician supervised weight loss program, which did not result in any meaningful weight loss.\par Had  difficulties getting pregnant , and undergone fertility treatment\par Miscarriages - none\par +facial acne \par Denies increased facial/body hair growth, but does complain of scalp hair thinning\par Denies: breast discharge, shoe/ring size change, easy bruising or new purple stretch marks on the abdomen/thighs. \par Last gyn exam- 2018\par Pelvic US - 2018, reports normal.\par Last TSH- 3.54, a1c- 5.4\par

## 2022-09-15 NOTE — REVIEW OF SYSTEMS
Physical Therapy  Facility/Department: [address] MED SURG  Physical Therapy Initial Assessment    Name: Haylie Wilson  : 1953  MRN: [id number]  Date of Service: 9/15/2022    Discharge Recommendations:  Therapy recommended at discharge, Patient would benefit from continued therapy after discharge, Continue to assess pending progress    Haylie Wilson scored a 16/24 on the AM-PAC short mobility form. Current research shows that an AM-PAC score of 17 or less is typically not associated with a discharge to the patient's home setting. Based on the patient's AM-PAC score and their current functional mobility deficits, it is recommended that the patient have 3-5 sessions per week of Physical Therapy at d/c to increase the patient's independence. Please see assessment section for further patient specific details. If patient discharges prior to next session this note will serve as a discharge summary. Please see below for the latest assessment towards goals. Patient Diagnosis(es): The primary encounter diagnosis was Syncope and collapse. A diagnosis of Dizziness was also pertinent to this visit. Past Medical History:  has a past medical history of Diabetes mellitus ([address] Gallstones, Lymphedema, Neuropathy, Pancreatitis, PVD (peripheral vascular disease) ([address] and Ulcers of both lower legs ([address] Past Surgical History:  has a past surgical history that includes Foot surgery (Left, 1967); ERCP (2014); Cholecystectomy, laparoscopic (N/A, 08/10/2021); and Pacemaker insertion. Assessment   Assessment: Pt is a 76 y.o. male who presented to the ED on 22 one hour after discharge secondary to syncopal episode. He was previously admitted  -  due to septicemia. Prior to previous admission, pt living alone in home setting, independent with ADLs and ambulation with SPC. Pt currently functioning well below baseline - unsteady with walker.  Recommend continued skilled therapy 3-5x/week to maximize independence and safety with functional mobility. Treatment Diagnosis: impaired mobility  Therapy Prognosis: Fair  Decision Making: Medium Complexity  History: see above  Exam: see below  Clinical Presentation: evolving  Requires PT Follow-Up: Yes  Activity Tolerance  Activity Tolerance: Patient limited by endurance     Plan   Plan  Plan: 3-5 times per week  Current Treatment Recommendations: Strengthening, Functional mobility training, Transfer training, Gait training, Safety education & training, Patient/Caregiver education & training, Neuromuscular re-education, Balance training, Equipment evaluation, education, & procurement, Therapeutic activities  Safety Devices  Type of Devices: All fall risk precautions in place, Call light within reach, Gait belt, Patient at risk for falls, Left in chair, Chair alarm in place, Nurse notified     Restrictions  Restrictions/Precautions  Restrictions/Precautions: Fall Risk  Position Activity Restriction  Other position/activity restrictions: 9/15/22 orthostatics: supine 155/78, seated 125/68, standing 118/72     Subjective   General  Chart Reviewed: Yes  Patient assessed for rehabilitation services?: Yes  Additional Pertinent Hx: Pt is a 76 y.o. male who presented to the ED on 9/13/22 one hour after discharge secondary to syncopal episode. He was previously admitted 9/6 - 9/13 due to septicemia. Response To Previous Treatment: Not applicable  Family / Caregiver Present: No  Referring Practitioner: Shireen Jaimes MD  Referral Date : 09/14/22  Follows Commands: Within Functional Limits  Subjective  Subjective: Pt is agreeable to PT - \"I haven't felt good for 4 months\".          Social/Functional History  Social/Functional History  Lives With: Alone  Type of Home: House  Home Layout: Multi-level, Able to Live on Main level with bedroom/bathroom, Laundry in basement (Washes clothes in sink; doesn't go to lower level.)  Home Access: Stairs to enter with rails (Reports 10-11 steps with handrail although pt never leaves home (only with EMS if to hospital). )  Entrance Stairs - Number of Steps: 11 DONNY  Entrance Stairs - Rails: Both (far spaced)  Bathroom Shower/Tub: Shower chair with back, Tub/Shower unit (Sponge bathes only.)  Bathroom Toilet: Bedside commode  Bathroom Equipment: Toilet raiser, Shower chair, Grab bars in shower  Bathroom Accessibility: Accessible  Home Equipment: Ramona Chase, standard, Valentine Sicks, 4 wheeled, Whitaker Simon, [place], [employer]  Neymar Help From: Personal care attendant (Wound care nurse 3x/wk. Visiting MD/Nurse 1x/month. HHA 1x/wk (get groceries,etc). )  ADL Assistance: Independent (Sponge bathes.)  Homemaking Assistance:  (No assist; does what he can. Washes clothes in sink.)  Ambulation Assistance: Independent (With Cane in home; doesn't go out.)  Transfer Assistance: Independent  Active : No (HHA obtains groceries.)  Patient's  Info: Pt reports he does not drive  Mode of Transportation: Friends, Cab  Occupation: Unemployed  Type of Occupation: States he never worked  IADL Comments: Sleeps on recliner or couch. Additional Comments: Visiting Nurse 3x/wk. HHA 1x/wk for grocery shopping.     Vision/Hearing  Vision  Vision: Impaired  Vision Exceptions: Wears glasses for reading  Hearing  Hearing: Within functional limits      Cognition   Orientation  Overall Orientation Status: Within Functional Limits  Cognition  Overall Cognitive Status: WFL  Cognition Comment: Some decreased safety/insight     Objective   Gross Assessment  AROM: Generally decreased, functional (bilateral ER of feet)  Strength: Generally decreased, functional     Bed mobility  Supine to Sit: Stand by assistance  Sit to Supine: Stand by assistance  Transfers  Sit to Stand: Minimal Assistance  Stand to sit: Minimal Assistance  Comment: Initially unsteady  Ambulation  Surface: level tile  Device: Rolling Walker  Assistance: Minimal assistance  Gait Deviations: [Negative] : Heme/Lymph

## 2022-09-16 ENCOUNTER — TRANSCRIPTION ENCOUNTER (OUTPATIENT)
Age: 37
End: 2022-09-16

## 2022-11-10 ENCOUNTER — NON-APPOINTMENT (OUTPATIENT)
Age: 37
End: 2022-11-10

## 2022-12-29 ENCOUNTER — TRANSCRIPTION ENCOUNTER (OUTPATIENT)
Age: 37
End: 2022-12-29

## 2023-01-05 ENCOUNTER — TRANSCRIPTION ENCOUNTER (OUTPATIENT)
Age: 38
End: 2023-01-05

## 2023-01-09 ENCOUNTER — APPOINTMENT (OUTPATIENT)
Dept: ENDOCRINOLOGY | Facility: CLINIC | Age: 38
End: 2023-01-09
Payer: COMMERCIAL

## 2023-01-09 VITALS
HEART RATE: 64 BPM | WEIGHT: 250 LBS | OXYGEN SATURATION: 99 % | SYSTOLIC BLOOD PRESSURE: 122 MMHG | HEIGHT: 67 IN | DIASTOLIC BLOOD PRESSURE: 72 MMHG | RESPIRATION RATE: 16 BRPM | TEMPERATURE: 97.3 F | BODY MASS INDEX: 39.24 KG/M2

## 2023-01-09 PROCEDURE — 99214 OFFICE O/P EST MOD 30 MIN: CPT

## 2023-01-09 NOTE — HISTORY OF PRESENT ILLNESS
[FreeTextEntry1] : 36 year  female f/u for weight management\par \par *** Jan 09, 2023 ***\par \par feels well. lost 18 lbs since the last visit on samples of Ozempic 0.5 mg qw, and overall about 50 lbs since last year\par receives iron infusion from Dr. Reddy's office\par no recent labs, plans to repeat with hem/onc\par \par *** Sep 13, 2022 ***\par \par did not start wegovy. was not ready for it first, then the medication was on backorder\par had lapse with the insurance, was more depressed- recently went back on wellbutrin and lexapro\par upset about her weight\par did not see hem/onc yet\par \par *** May 03, 2022 ***\par \par was on Saxenda 3 mg , but did not lose more weight. Traveled to [place]- was off Saxenda there, on the return developed a travelers diarrhea and overall was off Saxenda x 3 weeks. Resumed a smaller dose now, still no results\par otherwise  feels ok, still tired \par did not see gyn or hem/onc yet\par \par *** Haroon 10, 2022 ***\par \par taking Saxenda 3 mg qd. Lost 23 lbs in about 2 months. on the maximum dose about 2 weeks- with some  nausea\par otherwise, feels well and is very pleased with a weight loss\par not using CPAP machine, but is still waking up at nights frequently-  exhausted during the day\par \par *** Oct 12, 2021 ***\par \par last visit more than 2 yrs ago\par s/p sleeve gastrectomy in 03/20 ([facility]). Lost only 40 lbs since the sx. \par not on only weight loss meds. Failed phentermine. off OCPs since the sx. Cycle improved post sx\par taking wellbutrin and had lexapro added today for her depression\par \par *** July 01, 2019 ***\par \par 24h UFC- 18\par TSH- 2.1\par neg tpo/tg ab\par B12- 250\par 25D- 22\par testo- 24.2. normal 17ohP/Preg\par fasting glu- 77 with insulin 14\par \par HPI:\par She was struggling with her weight since her childhood. She gained about 100 lbs in her senior year of high school, and had continuously kept gaining weight since then. She was diagnosed with mild hypothyroidism during her fertility evaluation about 9 years ago, and was on L-thyroxine replacement for about a year. Since then her thyroid levels were fine, and she never required treatment with her second pregnancy. She was also diagnosed with a prediabetes at that time , and was briefly on metformin. \par complains of  irregular menses x many years\par Menarche at 14 yo. \par LMP - currently now. \par She is on OCP's right now, and also taking phentermine; however is not successful on it. She tried weight watchers and other physician supervised weight loss program, which did not result in any meaningful weight loss.\par Had  difficulties getting pregnant , and undergone fertility treatment\par Miscarriages - none\par +facial acne \par Denies increased facial/body hair growth, but does complain of scalp hair thinning\par Denies: breast discharge, shoe/ring size change, easy bruising or new purple stretch marks on the abdomen/thighs. \par Last gyn exam- 2018\par Pelvic US - 2018, reports normal.\par Last TSH- 3.54, a1c- 5.4\par

## 2023-01-09 NOTE — ASSESSMENT
[FreeTextEntry1] : 1. Possible underlying PCOS\par - fasting insulin is inappropriate for glucose levels. prev d/w starting metformin, but patient prefers holding for now\par \par 2. Obesity\par s/p sleeve with some weight loss\par - Current approaches to weight management are discussed with the patient. \par Suggested extensive nutritional education program. Proper dietary restrictions and exercise routines discussed. \par Medical weight loss therapies were reviewed with the patient. - \par - advised on interactions b.w some of wt loss meds and SSRI's. prev failed phentermine\par - plateaued on saxenda - successful on Ozempic 0.5 mg samples. Wegovy seems to be back in stock, will rx 1 mg qw . Advised on c/ception. \par - dietary changes and incorporate stationary bike, weight lifting\par - resume CPAP and f/u with pulmonary. \par \par RTC 3 months\par \par \par

## 2023-01-25 ENCOUNTER — TRANSCRIPTION ENCOUNTER (OUTPATIENT)
Age: 38
End: 2023-01-25

## 2023-02-06 ENCOUNTER — TRANSCRIPTION ENCOUNTER (OUTPATIENT)
Age: 38
End: 2023-02-06

## 2023-02-09 ENCOUNTER — TRANSCRIPTION ENCOUNTER (OUTPATIENT)
Age: 38
End: 2023-02-09

## 2023-02-13 ENCOUNTER — TRANSCRIPTION ENCOUNTER (OUTPATIENT)
Age: 38
End: 2023-02-13

## 2023-03-02 ENCOUNTER — TRANSCRIPTION ENCOUNTER (OUTPATIENT)
Age: 38
End: 2023-03-02

## 2023-03-03 ENCOUNTER — TRANSCRIPTION ENCOUNTER (OUTPATIENT)
Age: 38
End: 2023-03-03

## 2023-03-15 ENCOUNTER — TRANSCRIPTION ENCOUNTER (OUTPATIENT)
Age: 38
End: 2023-03-15

## 2023-03-16 ENCOUNTER — TRANSCRIPTION ENCOUNTER (OUTPATIENT)
Age: 38
End: 2023-03-16

## 2023-03-22 NOTE — PHYSICAL EXAM
Quality 226: Preventive Care And Screening: Tobacco Use: Screening And Cessation Intervention: Tobacco Screening not Performed Quality 130: Documentation Of Current Medications In The Medical Record: Current Medications Documented Quality 431: Preventive Care And Screening: Unhealthy Alcohol Use - Screening: Patient not identified as an unhealthy alcohol user when screened for unhealthy alcohol use using a systematic screening method Detail Level: Detailed [Well Nourished] : well nourished [Normal Oropharynx] : the oropharynx was normal [Clear to Auscultation] : lungs were clear to auscultation bilaterally [No Accessory Muscle Use] : no accessory muscle use [Regular Rhythm] : with a regular rhythm [Normal S1, S2] : normal S1 and S2 [Non Tender] : non-tender [No CVA Tenderness] : no CVA  tenderness [No Joint Swelling] : no joint swelling [Normal Gait] : normal gait [Normal Affect] : the affect was normal

## 2023-03-23 ENCOUNTER — TRANSCRIPTION ENCOUNTER (OUTPATIENT)
Age: 38
End: 2023-03-23

## 2023-04-03 ENCOUNTER — TRANSCRIPTION ENCOUNTER (OUTPATIENT)
Age: 38
End: 2023-04-03

## 2023-04-03 RX ORDER — PEN NEEDLE, DIABETIC 29 G X1/2"
32G X 4 MM NEEDLE, DISPOSABLE MISCELLANEOUS
Qty: 100 | Refills: 3 | Status: ACTIVE | COMMUNITY
Start: 2021-10-12 | End: 1900-01-01

## 2023-04-10 ENCOUNTER — APPOINTMENT (OUTPATIENT)
Dept: ENDOCRINOLOGY | Facility: CLINIC | Age: 38
End: 2023-04-10
Payer: MEDICAID

## 2023-04-10 VITALS
BODY MASS INDEX: 39.55 KG/M2 | OXYGEN SATURATION: 98 % | SYSTOLIC BLOOD PRESSURE: 118 MMHG | DIASTOLIC BLOOD PRESSURE: 79 MMHG | HEART RATE: 81 BPM | WEIGHT: 252 LBS | HEIGHT: 67 IN

## 2023-04-10 PROCEDURE — 99214 OFFICE O/P EST MOD 30 MIN: CPT | Mod: 25

## 2023-04-10 PROCEDURE — 36415 COLL VENOUS BLD VENIPUNCTURE: CPT

## 2023-04-10 NOTE — HISTORY OF PRESENT ILLNESS
[FreeTextEntry1] : 36 year  female f/u for weight management\par \par *** Apr 10, 2023 ***\par \par wegovy denied, just reapproved for saxenda- just started 1.8 mcg dose. feels that weight is stalled\par started on new OCP's\par \par *** Jan 09, 2023 ***\par \par feels well. lost 18 lbs since the last visit on samples of Ozempic 0.5 mg qw, and overall about 50 lbs since last year\par receives iron infusion from Dr. Reddy's office\par no recent labs, plans to repeat with hem/onc\par \par *** Sep 13, 2022 ***\par \par did not start wegovy. was not ready for it first, then the medication was on backorder\par had lapse with the insurance, was more depressed- recently went back on wellbutrin and lexapro\par upset about her weight\par did not see hem/onc yet\par \par *** May 03, 2022 ***\par \par was on Saxenda 3 mg , but did not lose more weight. Traveled to [place]- was off Saxenda there, on the return developed a travelers diarrhea and overall was off Saxenda x 3 weeks. Resumed a smaller dose now, still no results\par otherwise  feels ok, still tired \par did not see gyn or hem/onc yet\par \par *** Haroon 10, 2022 ***\par \par taking Saxenda 3 mg qd. Lost 23 lbs in about 2 months. on the maximum dose about 2 weeks- with some  nausea\par otherwise, feels well and is very pleased with a weight loss\par not using CPAP machine, but is still waking up at nights frequently-  exhausted during the day\par \par *** Oct 12, 2021 ***\par \par last visit more than 2 yrs ago\par s/p sleeve gastrectomy in 03/20 ([facility]). Lost only 40 lbs since the sx. \par not on only weight loss meds. Failed phentermine. off OCPs since the sx. Cycle improved post sx\par taking wellbutrin and had lexapro added today for her depression\par \par *** July 01, 2019 ***\par \par 24h UFC- 18\par TSH- 2.1\par neg tpo/tg ab\par B12- 250\par 25D- 22\par testo- 24.2. normal 17ohP/Preg\par fasting glu- 77 with insulin 14\par \par HPI:\par She was struggling with her weight since her childhood. She gained about 100 lbs in her senior year of high school, and had continuously kept gaining weight since then. She was diagnosed with mild hypothyroidism during her fertility evaluation about 9 years ago, and was on L-thyroxine replacement for about a year. Since then her thyroid levels were fine, and she never required treatment with her second pregnancy. She was also diagnosed with a prediabetes at that time , and was briefly on metformin. \par complains of  irregular menses x many years\par Menarche at 12 yo. \par LMP - currently now. \par She is on OCP's right now, and also taking phentermine; however is not successful on it. She tried weight watchers and other physician supervised weight loss program, which did not result in any meaningful weight loss.\par Had  difficulties getting pregnant , and undergone fertility treatment\par Miscarriages - none\par +facial acne \par Denies increased facial/body hair growth, but does complain of scalp hair thinning\par Denies: breast discharge, shoe/ring size change, easy bruising or new purple stretch marks on the abdomen/thighs. \par Last gyn exam- 2018\par Pelvic US - 2018, reports normal.\par Last TSH- 3.54, a1c- 5.4\par

## 2023-04-10 NOTE — ASSESSMENT
[FreeTextEntry1] : 1. Possible underlying PCOS\par - fasting insulin is inappropriate for glucose levels. prev d/w starting metformin, but patient prefers holding for now\par \par 2. Obesity\par s/p sleeve with some weight loss\par - Current approaches to weight management are discussed with the patient. \par Suggested extensive nutritional education program. Proper dietary restrictions and exercise routines discussed. \par Medical weight loss therapies were reviewed with the patient. - \par - advised on interactions b.w some of wt loss meds and SSRI's. prev failed phentermine\par - plateaued on saxenda before, and successful  on Wegovy prior. Will retry Saxenda again, and if no success will reapply for wegovy PA. Advised on c/ception. \par - dietary changes and incorporate stationary bike, weight lifting\par - resume CPAP and f/u with pulmonary. \par \par RTC 3 months\par \par \par

## 2023-04-11 LAB
ALBUMIN SERPL ELPH-MCNC: 4.3 G/DL
ALP BLD-CCNC: 70 U/L
ALT SERPL-CCNC: 21 U/L
ANION GAP SERPL CALC-SCNC: 12 MMOL/L
AST SERPL-CCNC: 20 U/L
BILIRUB SERPL-MCNC: 0.4 MG/DL
BUN SERPL-MCNC: 9 MG/DL
CALCIUM SERPL-MCNC: 9.4 MG/DL
CHLORIDE SERPL-SCNC: 106 MMOL/L
CO2 SERPL-SCNC: 23 MMOL/L
CREAT SERPL-MCNC: 0.8 MG/DL
EGFR: 97 ML/MIN/1.73M2
ESTIMATED AVERAGE GLUCOSE: 117 MG/DL
GLUCOSE SERPL-MCNC: 72 MG/DL
HBA1C MFR BLD HPLC: 5.7 %
POTASSIUM SERPL-SCNC: 4.8 MMOL/L
PROT SERPL-MCNC: 6.9 G/DL
SODIUM SERPL-SCNC: 141 MMOL/L
T4 FREE SERPL-MCNC: 1.2 NG/DL
TSH SERPL-ACNC: 1.38 UIU/ML

## 2023-06-04 ENCOUNTER — TRANSCRIPTION ENCOUNTER (OUTPATIENT)
Age: 38
End: 2023-06-04

## 2023-06-13 NOTE — HISTORY OF PRESENT ILLNESS
[No Pertinent Cardiac History] : no history of aortic stenosis, atrial fibrillation, coronary artery disease, recent myocardial infarction, or implantable device/pacemaker [No Pertinent Pulmonary History] : no history of asthma, COPD, sleep apnea, or smoking [(Patient denies any chest pain, claudication, dyspnea on exertion, orthopnea, palpitations or syncope)] : Patient denies any chest pain, claudication, dyspnea on exertion, orthopnea, palpitations or syncope [Chronic Anticoagulation] : no chronic anticoagulation [Chronic Kidney Disease] : no chronic kidney disease [Diabetes] : no diabetes [FreeTextEntry1] : laparoscopic sleeve gastrectomy [FreeTextEntry2] : 3/19/2020 [FreeTextEntry3] : Dr. Berkowitz [FreeTextEntry4] : 34 year old female  is here for medical clearance for an upcoming surgery for electric gastric sleeve with cholecystectomy.  All medical problems and medicines were documented and reviewed with the patient. \par Patient was counseled to stop any advil/alieve/aspirin 7 days prior to surgery and was advised to have nothing by mouth from 11 pm the night prior to surgery. \par Medications were reviewed with patient and patient was directed on which medications to be taken and not to be taken prior to surgery.\par Labs were reviewed with the patient.\par  Detail Level: Detailed Photo Preface (Leave Blank If You Do Not Want): Photographs were obtained today.

## 2023-07-19 ENCOUNTER — APPOINTMENT (OUTPATIENT)
Dept: ENDOCRINOLOGY | Facility: CLINIC | Age: 38
End: 2023-07-19
Payer: COMMERCIAL

## 2023-07-19 VITALS
BODY MASS INDEX: 39.6 KG/M2 | SYSTOLIC BLOOD PRESSURE: 116 MMHG | HEART RATE: 72 BPM | OXYGEN SATURATION: 99 % | WEIGHT: 252.31 LBS | DIASTOLIC BLOOD PRESSURE: 84 MMHG | RESPIRATION RATE: 18 BRPM | HEIGHT: 67 IN

## 2023-07-19 PROCEDURE — 36415 COLL VENOUS BLD VENIPUNCTURE: CPT

## 2023-07-19 PROCEDURE — 99214 OFFICE O/P EST MOD 30 MIN: CPT | Mod: 25

## 2023-07-19 NOTE — HISTORY OF PRESENT ILLNESS
[FreeTextEntry1] : 36 year  female f/u for weight management\par \par *** Jul 19, 2023 ***\par \par feels well. has been on Saxenda 3 mg x 3 months. did not lose any weight\par last a1c - 5.7\par \par *** Apr 10, 2023 ***\par \par wegovy denied, just reapproved for saxenda- just started 1.8 mcg dose. feels that weight is stalled\par started on new OCP's\par \par *** Jan 09, 2023 ***\par \par feels well. lost 18 lbs since the last visit on samples of Ozempic 0.5 mg qw, and overall about 50 lbs since last year\par receives iron infusion from Dr. Reddy's office\par no recent labs, plans to repeat with hem/onc\par \par *** Sep 13, 2022 ***\par \par did not start wegovy. was not ready for it first, then the medication was on backorder\par had lapse with the insurance, was more depressed- recently went back on wellbutrin and lexapro\par upset about her weight\par did not see hem/onc yet\par \par *** May 03, 2022 ***\par \par was on Saxenda 3 mg , but did not lose more weight. Traveled to [place]- was off Saxenda there, on the return developed a travelers diarrhea and overall was off Saxenda x 3 weeks. Resumed a smaller dose now, still no results\par otherwise  feels ok, still tired \par did not see gyn or hem/onc yet\par \par *** Haroon 10, 2022 ***\par \par taking Saxenda 3 mg qd. Lost 23 lbs in about 2 months. on the maximum dose about 2 weeks- with some  nausea\par otherwise, feels well and is very pleased with a weight loss\par not using CPAP machine, but is still waking up at nights frequently-  exhausted during the day\par \par *** Oct 12, 2021 ***\par \par last visit more than 2 yrs ago\par s/p sleeve gastrectomy in 03/20 ([facility]). Lost only 40 lbs since the sx. \par not on only weight loss meds. Failed phentermine. off OCPs since the sx. Cycle improved post sx\par taking wellbutrin and had lexapro added today for her depression\par \par *** July 01, 2019 ***\par \par 24h UFC- 18\par TSH- 2.1\par neg tpo/tg ab\par B12- 250\par 25D- 22\par testo- 24.2. normal 17ohP/Preg\par fasting glu- 77 with insulin 14\par \par HPI:\par She was struggling with her weight since her childhood. She gained about 100 lbs in her senior year of high school, and had continuously kept gaining weight since then. She was diagnosed with mild hypothyroidism during her fertility evaluation about 9 years ago, and was on L-thyroxine replacement for about a year. Since then her thyroid levels were fine, and she never required treatment with her second pregnancy. She was also diagnosed with a prediabetes at that time , and was briefly on metformin. \par complains of  irregular menses x many years\par Menarche at 14 yo. \par LMP - currently now. \par She is on OCP's right now, and also taking phentermine; however is not successful on it. She tried weight watchers and other physician supervised weight loss program, which did not result in any meaningful weight loss.\par Had  difficulties getting pregnant , and undergone fertility treatment\par Miscarriages - none\par +facial acne \par Denies increased facial/body hair growth, but does complain of scalp hair thinning\par Denies: breast discharge, shoe/ring size change, easy bruising or new purple stretch marks on the abdomen/thighs. \par Last gyn exam- 2018\par Pelvic US - 2018, reports normal.\par Last TSH- 3.54, a1c- 5.4\par

## 2023-07-19 NOTE — ASSESSMENT
[FreeTextEntry1] : 1. Possible underlying PCOS\par - fasting insulin is inappropriate for glucose levels. again d/w pt starting metformin- will resume Metformin  mg w/ dinner, then increase to 2 tabs with dinner\par \par 2. Obesity\par s/p sleeve with some weight loss\par - Current approaches to weight management are discussed with the patient. \par Suggested extensive nutritional education program. Proper dietary restrictions and exercise routines discussed. \par Medical weight loss therapies were reviewed with the patient. - \par - advised on interactions b.w some of wt loss meds and SSRI's. prev failed phentermine\par - failed Saxenda. prev successful  on Wegovy will reapply for wegovy PA. Advised on c/ception. \par - dietary changes and incorporate stationary bike, weight lifting\par - resume CPAP and f/u with pulmonary. \par \par RTC 3 months\par \par \par

## 2023-07-21 LAB
ALBUMIN SERPL ELPH-MCNC: 4.1 G/DL
ALP BLD-CCNC: 58 U/L
ALT SERPL-CCNC: 9 U/L
ANION GAP SERPL CALC-SCNC: 12 MMOL/L
AST SERPL-CCNC: 15 U/L
BILIRUB SERPL-MCNC: 0.2 MG/DL
BUN SERPL-MCNC: 9 MG/DL
CALCIUM SERPL-MCNC: 9.3 MG/DL
CHLORIDE SERPL-SCNC: 107 MMOL/L
CO2 SERPL-SCNC: 24 MMOL/L
CREAT SERPL-MCNC: 0.86 MG/DL
EGFR: 89 ML/MIN/1.73M2
ESTIMATED AVERAGE GLUCOSE: 94 MG/DL
GLUCOSE SERPL-MCNC: 73 MG/DL
HBA1C MFR BLD HPLC: 4.9 %
POTASSIUM SERPL-SCNC: 3.9 MMOL/L
PROT SERPL-MCNC: 7 G/DL
SODIUM SERPL-SCNC: 143 MMOL/L
T4 FREE SERPL-MCNC: 1.2 NG/DL
TSH SERPL-ACNC: 2.04 UIU/ML

## 2023-07-31 ENCOUNTER — APPOINTMENT (OUTPATIENT)
Dept: FAMILY MEDICINE | Facility: CLINIC | Age: 38
End: 2023-07-31

## 2023-08-03 ENCOUNTER — TRANSCRIPTION ENCOUNTER (OUTPATIENT)
Age: 38
End: 2023-08-03

## 2023-08-08 ENCOUNTER — NON-APPOINTMENT (OUTPATIENT)
Age: 38
End: 2023-08-08

## 2023-08-15 ENCOUNTER — TRANSCRIPTION ENCOUNTER (OUTPATIENT)
Age: 38
End: 2023-08-15

## 2023-08-25 ENCOUNTER — TRANSCRIPTION ENCOUNTER (OUTPATIENT)
Age: 38
End: 2023-08-25

## 2023-08-31 ENCOUNTER — TRANSCRIPTION ENCOUNTER (OUTPATIENT)
Age: 38
End: 2023-08-31

## 2023-08-31 RX ORDER — SEMAGLUTIDE 0.25 MG/.5ML
0.25 INJECTION, SOLUTION SUBCUTANEOUS
Qty: 1 | Refills: 0 | Status: DISCONTINUED | COMMUNITY
Start: 2022-05-03 | End: 2023-03-20

## 2023-09-08 ENCOUNTER — TRANSCRIPTION ENCOUNTER (OUTPATIENT)
Age: 38
End: 2023-09-08

## 2023-09-11 ENCOUNTER — TRANSCRIPTION ENCOUNTER (OUTPATIENT)
Age: 38
End: 2023-09-11

## 2023-09-14 RX ORDER — LIRAGLUTIDE 6 MG/ML
18 INJECTION, SOLUTION SUBCUTANEOUS
Qty: 1 | Refills: 6 | Status: ACTIVE | COMMUNITY
Start: 2021-10-12

## 2023-10-10 ENCOUNTER — APPOINTMENT (OUTPATIENT)
Dept: ENDOCRINOLOGY | Facility: CLINIC | Age: 38
End: 2023-10-10
Payer: COMMERCIAL

## 2023-10-10 VITALS
OXYGEN SATURATION: 100 % | SYSTOLIC BLOOD PRESSURE: 140 MMHG | BODY MASS INDEX: 40.05 KG/M2 | DIASTOLIC BLOOD PRESSURE: 77 MMHG | TEMPERATURE: 97.7 F | WEIGHT: 255.19 LBS | HEIGHT: 67 IN | HEART RATE: 63 BPM | RESPIRATION RATE: 17 BRPM

## 2023-10-10 PROCEDURE — 36415 COLL VENOUS BLD VENIPUNCTURE: CPT

## 2023-10-10 PROCEDURE — 99214 OFFICE O/P EST MOD 30 MIN: CPT | Mod: 25

## 2023-10-10 RX ORDER — CLOPIDOGREL 75 MG/1
75 TABLET, FILM COATED ORAL
Refills: 0 | Status: ACTIVE | COMMUNITY

## 2023-10-10 RX ORDER — ASPIRIN 81 MG
81 TABLET,CHEWABLE ORAL
Refills: 0 | Status: ACTIVE | COMMUNITY

## 2023-10-10 RX ORDER — ATORVASTATIN CALCIUM 80 MG/1
80 TABLET, FILM COATED ORAL
Refills: 0 | Status: ACTIVE | COMMUNITY

## 2023-10-11 ENCOUNTER — NON-APPOINTMENT (OUTPATIENT)
Age: 38
End: 2023-10-11

## 2023-10-15 ENCOUNTER — TRANSCRIPTION ENCOUNTER (OUTPATIENT)
Age: 38
End: 2023-10-15

## 2023-10-15 LAB
ALBUMIN SERPL ELPH-MCNC: 4.5 G/DL
ALP BLD-CCNC: 106 U/L
ALT SERPL-CCNC: 25 U/L
ANION GAP SERPL CALC-SCNC: 12 MMOL/L
AST SERPL-CCNC: 22 U/L
BILIRUB SERPL-MCNC: 0.8 MG/DL
BUN SERPL-MCNC: 8 MG/DL
CALCIUM SERPL-MCNC: 9.7 MG/DL
CHLORIDE SERPL-SCNC: 103 MMOL/L
CO2 SERPL-SCNC: 25 MMOL/L
CREAT SERPL-MCNC: 0.71 MG/DL
EGFR: 112 ML/MIN/1.73M2
ESTIMATED AVERAGE GLUCOSE: 97 MG/DL
GLUCOSE SERPL-MCNC: 79 MG/DL
HBA1C MFR BLD HPLC: 5 %
POTASSIUM SERPL-SCNC: 4.3 MMOL/L
PROT SERPL-MCNC: 7.7 G/DL
SODIUM SERPL-SCNC: 140 MMOL/L
T4 FREE SERPL-MCNC: 1.2 NG/DL
TSH SERPL-ACNC: 3.79 UIU/ML

## 2023-11-27 ENCOUNTER — TRANSCRIPTION ENCOUNTER (OUTPATIENT)
Age: 38
End: 2023-11-27

## 2023-12-18 ENCOUNTER — TRANSCRIPTION ENCOUNTER (OUTPATIENT)
Age: 38
End: 2023-12-18

## 2024-01-05 ENCOUNTER — TRANSCRIPTION ENCOUNTER (OUTPATIENT)
Age: 39
End: 2024-01-05

## 2024-01-09 ENCOUNTER — TRANSCRIPTION ENCOUNTER (OUTPATIENT)
Age: 39
End: 2024-01-09

## 2024-01-29 ENCOUNTER — APPOINTMENT (OUTPATIENT)
Dept: ENDOCRINOLOGY | Facility: CLINIC | Age: 39
End: 2024-01-29
Payer: COMMERCIAL

## 2024-01-29 VITALS
BODY MASS INDEX: 38.37 KG/M2 | SYSTOLIC BLOOD PRESSURE: 124 MMHG | OXYGEN SATURATION: 99 % | WEIGHT: 245 LBS | HEART RATE: 64 BPM | DIASTOLIC BLOOD PRESSURE: 82 MMHG

## 2024-01-29 PROCEDURE — 99214 OFFICE O/P EST MOD 30 MIN: CPT | Mod: 25

## 2024-01-29 RX ORDER — METFORMIN ER 500 MG 500 MG/1
500 TABLET ORAL DAILY
Qty: 180 | Refills: 2 | Status: ACTIVE | COMMUNITY
Start: 2024-01-29 | End: 1900-01-01

## 2024-01-29 RX ORDER — TIRZEPATIDE 2.5 MG/.5ML
2.5 INJECTION, SOLUTION SUBCUTANEOUS
Qty: 4 | Refills: 0 | Status: ACTIVE | COMMUNITY
Start: 2024-01-29 | End: 1900-01-01

## 2024-01-29 NOTE — ASSESSMENT
[FreeTextEntry1] : 1. Possible underlying PCOS - fasting insulin is inappropriate for glucose levels. again d/w pt starting metformin- will resume Metformin  mg w/ dinner, then increase to 2 tabs with dinner  2. Obesity. Prediabetes.. S/p recent TIA s/p sleeve with some weight loss - Current approaches to weight management are discussed with the patient. Suggested extensive nutritional education program. Proper dietary restrictions and exercise routines discussed. Medical weight loss therapies were reviewed with the patient. - - advised on interactions b.w some of wt loss meds and SSRI's. prev failed phentermine - failed Saxenda. prev successful on Wegovy- approved but on backorder. - switch to Zepbound (R+B). Advised on c/ception. - dietary changes and incorporate stationary bike, weight lifting - resume CPAP and f/u with pulmonary.  RTC 3 months.

## 2024-01-29 NOTE — HISTORY OF PRESENT ILLNESS
[FreeTextEntry1] : 37 year  female f/u for weight management  *** Jan 29, 2024 ***  ran out of ozempic samples- was able to lose 15 lbs while on it, still unable to receive Wegovy (on a backorder). regained all weight back labs done today at the hem/onc office did not resume Metformin yet  *** Oct 10, 2023 ***  wegovy approved, but is on backorder. able to get ozempic, but did not start yet gained more weight had a TIA in 08/23 - woke up with a left sided weakness; went to ER; no TPA; but started on plavix, lipitor, aspirin came off metformin  *** Jul 19, 2023 ***  feels well. has been on Saxenda 3 mg x 3 months. did not lose any weight last a1c - 5.7  *** Apr 10, 2023 ***  wegovy denied, just reapproved for saxenda- just started 1.8 mcg dose. feels that weight is stalled started on new OCP's  *** Jan 09, 2023 ***  feels well. lost 18 lbs since the last visit on samples of Ozempic 0.5 mg qw, and overall about 50 lbs since last year receives iron infusion from Dr. Reddy's office no recent labs, plans to repeat with hem/onc  *** Sep 13, 2022 ***  did not start wegovy. was not ready for it first, then the medication was on backorder had lapse with the insurance, was more depressed- recently went back on wellbutrin and lexapro upset about her weight did not see hem/onc yet  *** May 03, 2022 ***  was on Saxenda 3 mg , but did not lose more weight. Traveled to [place]- was off Saxenda there, on the return developed a travelers diarrhea and overall was off Saxenda x 3 weeks. Resumed a smaller dose now, still no results otherwise  feels ok, still tired  did not see gyn or hem/onc yet  *** Haroon 10, 2022 ***  taking Saxenda 3 mg qd. Lost 23 lbs in about 2 months. on the maximum dose about 2 weeks- with some  nausea otherwise, feels well and is very pleased with a weight loss not using CPAP machine, but is still waking up at nights frequently-  exhausted during the day  *** Oct 12, 2021 ***  last visit more than 2 yrs ago s/p sleeve gastrectomy in 03/20 ([facility]). Lost only 40 lbs since the sx.  not on only weight loss meds. Failed phentermine. off OCPs since the sx. Cycle improved post sx taking wellbutrin and had lexapro added today for her depression  *** July 01, 2019 ***  24h UFC- 18 TSH- 2.1 neg tpo/tg ab B12- 250 25D- 22 testo- 24.2. normal 17ohP/Preg fasting glu- 77 with insulin 14  HPI: She was struggling with her weight since her childhood. She gained about 100 lbs in her senior year of high school, and had continuously kept gaining weight since then. She was diagnosed with mild hypothyroidism during her fertility evaluation about 9 years ago, and was on L-thyroxine replacement for about a year. Since then her thyroid levels were fine, and she never required treatment with her second pregnancy. She was also diagnosed with a prediabetes at that time , and was briefly on metformin.  complains of  irregular menses x many years Menarche at 14 yo.  LMP - currently now.  She is on OCP's right now, and also taking phentermine; however is not successful on it. She tried weight watchers and other physician supervised weight loss program, which did not result in any meaningful weight loss. Had  difficulties getting pregnant , and undergone fertility treatment Miscarriages - none +facial acne  Denies increased facial/body hair growth, but does complain of scalp hair thinning Denies: breast discharge, shoe/ring size change, easy bruising or new purple stretch marks on the abdomen/thighs.  Last gyn exam- 2018 Pelvic US - 2018, reports normal. Last TSH- 3.54, a1c- 5.4

## 2024-02-07 ENCOUNTER — TRANSCRIPTION ENCOUNTER (OUTPATIENT)
Age: 39
End: 2024-02-07

## 2024-02-12 ENCOUNTER — TRANSCRIPTION ENCOUNTER (OUTPATIENT)
Age: 39
End: 2024-02-12

## 2024-02-20 ENCOUNTER — TRANSCRIPTION ENCOUNTER (OUTPATIENT)
Age: 39
End: 2024-02-20

## 2024-02-29 ENCOUNTER — APPOINTMENT (OUTPATIENT)
Dept: HUMAN REPRODUCTION | Facility: CLINIC | Age: 39
End: 2024-02-29

## 2024-03-06 ENCOUNTER — TRANSCRIPTION ENCOUNTER (OUTPATIENT)
Age: 39
End: 2024-03-06

## 2024-03-07 ENCOUNTER — TRANSCRIPTION ENCOUNTER (OUTPATIENT)
Age: 39
End: 2024-03-07

## 2024-03-27 ENCOUNTER — TRANSCRIPTION ENCOUNTER (OUTPATIENT)
Age: 39
End: 2024-03-27

## 2024-03-28 RX ORDER — SEMAGLUTIDE 1 MG/.5ML
1 INJECTION, SOLUTION SUBCUTANEOUS
Qty: 1 | Refills: 0 | Status: ACTIVE | COMMUNITY
Start: 2023-01-09

## 2024-04-03 ENCOUNTER — APPOINTMENT (OUTPATIENT)
Dept: ENDOCRINOLOGY | Facility: CLINIC | Age: 39
End: 2024-04-03
Payer: COMMERCIAL

## 2024-04-03 VITALS
WEIGHT: 250 LBS | BODY MASS INDEX: 39.16 KG/M2 | DIASTOLIC BLOOD PRESSURE: 83 MMHG | SYSTOLIC BLOOD PRESSURE: 137 MMHG | HEART RATE: 68 BPM | OXYGEN SATURATION: 98 %

## 2024-04-03 DIAGNOSIS — E66.01 MORBID (SEVERE) OBESITY DUE TO EXCESS CALORIES: ICD-10-CM

## 2024-04-03 DIAGNOSIS — E28.2 POLYCYSTIC OVARIAN SYNDROME: ICD-10-CM

## 2024-04-03 DIAGNOSIS — I10 ESSENTIAL (PRIMARY) HYPERTENSION: ICD-10-CM

## 2024-04-03 DIAGNOSIS — E03.9 HYPOTHYROIDISM, UNSPECIFIED: ICD-10-CM

## 2024-04-03 DIAGNOSIS — E78.00 PURE HYPERCHOLESTEROLEMIA, UNSPECIFIED: ICD-10-CM

## 2024-04-03 PROCEDURE — 99214 OFFICE O/P EST MOD 30 MIN: CPT | Mod: 25

## 2024-04-03 PROCEDURE — 36415 COLL VENOUS BLD VENIPUNCTURE: CPT

## 2024-04-03 NOTE — HISTORY OF PRESENT ILLNESS
[FreeTextEntry1] : 38 year  female f/u for weight management  *** Apr 03, 2024 ***  tried Mounjaro sample for 1 month, lost weight, but Zepbound was not approved regained all weight since then Wegovy is still on backorder with a left lower quadrant/ flank abdominal discomfort for the past 3-4 months, not improving, no pain or palpable masses  no recent labs back on Metformin since the last visit ( abd discomfort started before metformin)  *** Jan 29, 2024 ***  ran out of ozempic samples- was able to lose 15 lbs while on it, still unable to receive Wegovy (on a backorder). regained all weight back labs done today at the hem/onc office did not resume Metformin yet  *** Oct 10, 2023 ***  wegovy approved, but is on backorder. able to get ozempic, but did not start yet gained more weight had a TIA in 08/23 - woke up with a left sided weakness; went to ER; no TPA; but started on plavix, lipitor, aspirin came off metformin  *** Jul 19, 2023 ***  feels well. has been on Saxenda 3 mg x 3 months. did not lose any weight last a1c - 5.7  *** Apr 10, 2023 ***  wegovy denied, just reapproved for saxenda- just started 1.8 mcg dose. feels that weight is stalled started on new OCP's  *** Jan 09, 2023 ***  feels well. lost 18 lbs since the last visit on samples of Ozempic 0.5 mg qw, and overall about 50 lbs since last year receives iron infusion from Dr. Reddy's office no recent labs, plans to repeat with hem/onc  *** Sep 13, 2022 ***  did not start wegovy. was not ready for it first, then the medication was on backorder had lapse with the insurance, was more depressed- recently went back on wellbutrin and lexapro upset about her weight did not see hem/onc yet  *** May 03, 2022 ***  was on Saxenda 3 mg , but did not lose more weight. Traveled to [place]- was off Saxenda there, on the return developed a travelers diarrhea and overall was off Saxenda x 3 weeks. Resumed a smaller dose now, still no results otherwise  feels ok, still tired  did not see gyn or hem/onc yet  *** Haroon 10, 2022 ***  taking Saxenda 3 mg qd. Lost 23 lbs in about 2 months. on the maximum dose about 2 weeks- with some  nausea otherwise, feels well and is very pleased with a weight loss not using CPAP machine, but is still waking up at nights frequently-  exhausted during the day  *** Oct 12, 2021 ***  last visit more than 2 yrs ago s/p sleeve gastrectomy in 03/20 ([facility]). Lost only 40 lbs since the sx.  not on only weight loss meds. Failed phentermine. off OCPs since the sx. Cycle improved post sx taking wellbutrin and had lexapro added today for her depression  *** July 01, 2019 ***  24h UFC- 18 TSH- 2.1 neg tpo/tg ab B12- 250 25D- 22 testo- 24.2. normal 17ohP/Preg fasting glu- 77 with insulin 14  HPI: She was struggling with her weight since her childhood. She gained about 100 lbs in her senior year of high school, and had continuously kept gaining weight since then. She was diagnosed with mild hypothyroidism during her fertility evaluation about 9 years ago, and was on L-thyroxine replacement for about a year. Since then her thyroid levels were fine, and she never required treatment with her second pregnancy. She was also diagnosed with a prediabetes at that time , and was briefly on metformin.  complains of  irregular menses x many years Menarche at 14 yo.  LMP - currently now.  She is on OCP's right now, and also taking phentermine; however is not successful on it. She tried weight watchers and other physician supervised weight loss program, which did not result in any meaningful weight loss. Had  difficulties getting pregnant , and undergone fertility treatment Miscarriages - none +facial acne  Denies increased facial/body hair growth, but does complain of scalp hair thinning Denies: breast discharge, shoe/ring size change, easy bruising or new purple stretch marks on the abdomen/thighs.  Last gyn exam- 2018 Pelvic US - 2018, reports normal. Last TSH- 3.54, a1c- 5.4

## 2024-04-03 NOTE — ASSESSMENT
[FreeTextEntry1] : 1. Possible underlying PCOS - fasting insulin is inappropriate for glucose levels. again d/w pt starting metformin- will resume Metformin  mg w/ dinner, then increase to 2 tabs with dinner  2. Obesity. Prediabetes.. S/p recent TIA s/p sleeve with some weight loss - Current approaches to weight management are discussed with the patient. Suggested extensive nutritional education program. Proper dietary restrictions and exercise routines discussed. Medical weight loss therapies were reviewed with the patient. - - advised on interactions b.w some of wt loss meds and SSRI's. prev failed phentermine - failed Saxenda. prev successful on Wegovy- approved but on backorder. - will reapply for  Zepbound (R+B). Advised on c/ception. - interim, might be able go back on Saxenda - dietary changes and incorporate stationary bike, weight lifting - resume CPAP and f/u with pulmonary.  3. Left abdominal discomfort, not improving ? post-op hernia will refer for CT abdomen- instructions on metformin given  RTC 3 months. Skin Checks Recommendations: at least annually Sunscreen Recommendations: Blue Lizard Detail Level: Generalized Detail Level: Zone Detail Level: Detailed

## 2024-04-04 ENCOUNTER — TRANSCRIPTION ENCOUNTER (OUTPATIENT)
Age: 39
End: 2024-04-04

## 2024-04-04 LAB
25(OH)D3 SERPL-MCNC: 14.1 NG/ML
ALBUMIN SERPL ELPH-MCNC: 4.5 G/DL
ALP BLD-CCNC: 80 U/L
ALT SERPL-CCNC: 13 U/L
ANION GAP SERPL CALC-SCNC: 13 MMOL/L
AST SERPL-CCNC: 13 U/L
BASOPHILS # BLD AUTO: 0.03 K/UL
BASOPHILS NFR BLD AUTO: 0.6 %
BILIRUB SERPL-MCNC: 0.7 MG/DL
BUN SERPL-MCNC: 8 MG/DL
CALCIUM SERPL-MCNC: 9.7 MG/DL
CHLORIDE SERPL-SCNC: 105 MMOL/L
CHOLEST SERPL-MCNC: 106 MG/DL
CO2 SERPL-SCNC: 25 MMOL/L
CREAT SERPL-MCNC: 0.71 MG/DL
EGFR: 112 ML/MIN/1.73M2
EOSINOPHIL # BLD AUTO: 0.06 K/UL
EOSINOPHIL NFR BLD AUTO: 1.2 %
ESTIMATED AVERAGE GLUCOSE: 94 MG/DL
FOLATE SERPL-MCNC: 11 NG/ML
GLUCOSE SERPL-MCNC: 67 MG/DL
HBA1C MFR BLD HPLC: 4.9 %
HCT VFR BLD CALC: 37 %
HDLC SERPL-MCNC: 52 MG/DL
HGB BLD-MCNC: 12.2 G/DL
IMM GRANULOCYTES NFR BLD AUTO: 0.4 %
IRON SATN MFR SERPL: 16 %
IRON SERPL-MCNC: 58 UG/DL
LDLC SERPL CALC-MCNC: 40 MG/DL
LYMPHOCYTES # BLD AUTO: 1.44 K/UL
LYMPHOCYTES NFR BLD AUTO: 27.7 %
MAN DIFF?: NORMAL
MCHC RBC-ENTMCNC: 30.3 PG
MCHC RBC-ENTMCNC: 33 GM/DL
MCV RBC AUTO: 92 FL
MONOCYTES # BLD AUTO: 0.34 K/UL
MONOCYTES NFR BLD AUTO: 6.6 %
NEUTROPHILS # BLD AUTO: 3.3 K/UL
NEUTROPHILS NFR BLD AUTO: 63.5 %
NONHDLC SERPL-MCNC: 54 MG/DL
PLATELET # BLD AUTO: 242 K/UL
POTASSIUM SERPL-SCNC: 4.6 MMOL/L
PROT SERPL-MCNC: 7.7 G/DL
RBC # BLD: 4.02 M/UL
RBC # FLD: 11.9 %
SODIUM SERPL-SCNC: 143 MMOL/L
T4 FREE SERPL-MCNC: 1.3 NG/DL
TIBC SERPL-MCNC: 372 UG/DL
TRIGL SERPL-MCNC: 65 MG/DL
TSH SERPL-ACNC: 2.64 UIU/ML
UIBC SERPL-MCNC: 314 UG/DL
VIT B12 SERPL-MCNC: 267 PG/ML
WBC # FLD AUTO: 5.19 K/UL

## 2024-04-04 RX ORDER — ERGOCALCIFEROL 1.25 MG/1
1.25 MG CAPSULE ORAL
Qty: 20 | Refills: 1 | Status: ACTIVE | COMMUNITY
Start: 2022-05-09 | End: 1900-01-01

## 2024-04-08 ENCOUNTER — TRANSCRIPTION ENCOUNTER (OUTPATIENT)
Age: 39
End: 2024-04-08

## 2024-04-08 RX ORDER — LIRAGLUTIDE 6 MG/ML
18 INJECTION, SOLUTION SUBCUTANEOUS
Qty: 3 | Refills: 3 | Status: ACTIVE | COMMUNITY
Start: 2024-04-08 | End: 1900-01-01

## 2024-04-08 RX ORDER — PEN NEEDLE, DIABETIC 32 GX 1/6"
32G X 4 MM NEEDLE, DISPOSABLE MISCELLANEOUS
Qty: 100 | Refills: 3 | Status: ACTIVE | COMMUNITY
Start: 2024-04-08 | End: 1900-01-01

## 2024-04-09 ENCOUNTER — TRANSCRIPTION ENCOUNTER (OUTPATIENT)
Age: 39
End: 2024-04-09

## 2024-04-10 ENCOUNTER — TRANSCRIPTION ENCOUNTER (OUTPATIENT)
Age: 39
End: 2024-04-10

## 2024-05-08 ENCOUNTER — RESULT REVIEW (OUTPATIENT)
Age: 39
End: 2024-05-08

## 2024-05-13 ENCOUNTER — APPOINTMENT (OUTPATIENT)
Dept: CT IMAGING | Facility: CLINIC | Age: 39
End: 2024-05-13
Payer: COMMERCIAL

## 2024-05-13 ENCOUNTER — OUTPATIENT (OUTPATIENT)
Dept: OUTPATIENT SERVICES | Facility: HOSPITAL | Age: 39
LOS: 1 days | End: 2024-05-13
Payer: COMMERCIAL

## 2024-05-13 DIAGNOSIS — E03.9 HYPOTHYROIDISM, UNSPECIFIED: ICD-10-CM

## 2024-05-13 PROCEDURE — 74177 CT ABD & PELVIS W/CONTRAST: CPT

## 2024-05-13 PROCEDURE — 74177 CT ABD & PELVIS W/CONTRAST: CPT | Mod: 26

## 2024-05-17 ENCOUNTER — TRANSCRIPTION ENCOUNTER (OUTPATIENT)
Age: 39
End: 2024-05-17

## 2024-05-31 ENCOUNTER — TRANSCRIPTION ENCOUNTER (OUTPATIENT)
Age: 39
End: 2024-05-31

## 2024-06-13 RX ORDER — TIRZEPATIDE 2.5 MG/.5ML
2.5 INJECTION, SOLUTION SUBCUTANEOUS
Qty: 4 | Refills: 5 | Status: ACTIVE | COMMUNITY
Start: 2024-01-29

## 2024-06-19 ENCOUNTER — TRANSCRIPTION ENCOUNTER (OUTPATIENT)
Age: 39
End: 2024-06-19

## 2024-07-24 ENCOUNTER — APPOINTMENT (OUTPATIENT)
Dept: ENDOCRINOLOGY | Facility: CLINIC | Age: 39
End: 2024-07-24
Payer: COMMERCIAL

## 2024-07-24 VITALS
SYSTOLIC BLOOD PRESSURE: 100 MMHG | WEIGHT: 240 LBS | RESPIRATION RATE: 16 BRPM | TEMPERATURE: 98.4 F | HEART RATE: 72 BPM | HEIGHT: 67 IN | BODY MASS INDEX: 37.67 KG/M2 | OXYGEN SATURATION: 100 % | DIASTOLIC BLOOD PRESSURE: 72 MMHG

## 2024-07-24 DIAGNOSIS — E78.00 PURE HYPERCHOLESTEROLEMIA, UNSPECIFIED: ICD-10-CM

## 2024-07-24 DIAGNOSIS — E66.01 MORBID (SEVERE) OBESITY DUE TO EXCESS CALORIES: ICD-10-CM

## 2024-07-24 DIAGNOSIS — E03.9 HYPOTHYROIDISM, UNSPECIFIED: ICD-10-CM

## 2024-07-24 DIAGNOSIS — E28.2 POLYCYSTIC OVARIAN SYNDROME: ICD-10-CM

## 2024-07-24 LAB
ALBUMIN SERPL ELPH-MCNC: 4.2 G/DL
ALP BLD-CCNC: 57 U/L
ALT SERPL-CCNC: 10 U/L
ANION GAP SERPL CALC-SCNC: 13 MMOL/L
AST SERPL-CCNC: 12 U/L
BILIRUB SERPL-MCNC: 0.6 MG/DL
BUN SERPL-MCNC: 11 MG/DL
CALCIUM SERPL-MCNC: 9.1 MG/DL
CHLORIDE SERPL-SCNC: 109 MMOL/L
CHOLEST SERPL-MCNC: 92 MG/DL
CO2 SERPL-SCNC: 22 MMOL/L
CREAT SERPL-MCNC: 0.84 MG/DL
EGFR: 91 ML/MIN/1.73M2
ESTIMATED AVERAGE GLUCOSE: 74 MG/DL
FOLATE SERPL-MCNC: 14.9 NG/ML
GLUCOSE SERPL-MCNC: 63 MG/DL
HBA1C MFR BLD HPLC: 4.2 %
HDLC SERPL-MCNC: 49 MG/DL
LDLC SERPL CALC-MCNC: 29 MG/DL
NONHDLC SERPL-MCNC: 43 MG/DL
POTASSIUM SERPL-SCNC: 4.5 MMOL/L
PROT SERPL-MCNC: 6.8 G/DL
SODIUM SERPL-SCNC: 144 MMOL/L
T4 FREE SERPL-MCNC: 1.1 NG/DL
TRIGL SERPL-MCNC: 60 MG/DL
TSH SERPL-ACNC: 2.18 UIU/ML
VIT B12 SERPL-MCNC: 287 PG/ML

## 2024-07-24 PROCEDURE — 99214 OFFICE O/P EST MOD 30 MIN: CPT

## 2024-07-24 NOTE — ASSESSMENT
[FreeTextEntry1] : 1. Possible underlying PCOS - fasting insulin is inappropriate for glucose levels. again d/w pt starting metformin-  Metformin  mg 2 tabs w/ dinner  2. Obesity. Prediabetes.. S/p recent TIA s/p sleeve with some weight loss - Current approaches to weight management are discussed with the patient. Suggested extensive nutritional education program. Proper dietary restrictions and exercise routines discussed. Medical weight loss therapies were reviewed with the patient. - - advised on interactions b.w some of wt loss meds and SSRI's. prev failed phentermine - failed Saxenda. prev successful on Wegovy- cont 1 mg qw for now - if fails, will reapply for  Zepbound (R+B). Advised on c/ception. - dietary changes and incorporate stationary bike, weight lifting - resume CPAP and f/u with pulmonary.  3. Left abdominal discomfort, not improving ? post-op hernia advsied to f/u with gyn re: CT abd findings  RTC 3 months.

## 2024-07-24 NOTE — HISTORY OF PRESENT ILLNESS
[FreeTextEntry1] : 38 year  female f/u for weight management  *** Jul 24, 2024 ***  zepbound was denied, but was able to get wegovy presently on 1 mg qw (started this dose yesterday). also, remains on Metformin lost 10 lbs so far with heavy periods, fibroids - has been getting iron infusions CT abd (5/21/24)- Fluid within endometrial canal. Small bilateral follicular ovarian cysts measuring 1.8 cm on the right and 1.4 cm left. No adnexal mass lesions. seeing gyn  *** Apr 03, 2024 ***  tried Mounjaro sample for 1 month, lost weight, but Zepbound was not approved regained all weight since then Wegovy is still on backorder with a left lower quadrant/ flank abdominal discomfort for the past 3-4 months, not improving, no pain or palpable masses  no recent labs back on Metformin since the last visit ( abd discomfort started before metformin)  *** Jan 29, 2024 ***  ran out of ozempic samples- was able to lose 15 lbs while on it, still unable to receive Wegovy (on a backorder). regained all weight back labs done today at the hem/onc office did not resume Metformin yet  *** Oct 10, 2023 ***  wegovy approved, but is on backorder. able to get ozempic, but did not start yet gained more weight had a TIA in 08/23 - woke up with a left sided weakness; went to ER; no TPA; but started on plavix, lipitor, aspirin came off metformin  *** Jul 19, 2023 ***  feels well. has been on Saxenda 3 mg x 3 months. did not lose any weight last a1c - 5.7  *** Apr 10, 2023 ***  wegovy denied, just reapproved for saxenda- just started 1.8 mcg dose. feels that weight is stalled started on new OCP's  *** Jan 09, 2023 ***  feels well. lost 18 lbs since the last visit on samples of Ozempic 0.5 mg qw, and overall about 50 lbs since last year receives iron infusion from Dr. Reddy's office no recent labs, plans to repeat with hem/onc  *** Sep 13, 2022 ***  did not start wegovy. was not ready for it first, then the medication was on backorder had lapse with the insurance, was more depressed- recently went back on wellbutrin and lexapro upset about her weight did not see hem/onc yet  *** May 03, 2022 ***  was on Saxenda 3 mg , but did not lose more weight. Traveled to [place]- was off Saxenda there, on the return developed a travelers diarrhea and overall was off Saxenda x 3 weeks. Resumed a smaller dose now, still no results otherwise  feels ok, still tired  did not see gyn or hem/onc yet  *** Haroon 10, 2022 ***  taking Saxenda 3 mg qd. Lost 23 lbs in about 2 months. on the maximum dose about 2 weeks- with some  nausea otherwise, feels well and is very pleased with a weight loss not using CPAP machine, but is still waking up at nights frequently-  exhausted during the day  *** Oct 12, 2021 ***  last visit more than 2 yrs ago s/p sleeve gastrectomy in 03/20 ([facility]). Lost only 40 lbs since the sx.  not on only weight loss meds. Failed phentermine. off OCPs since the sx. Cycle improved post sx taking wellbutrin and had lexapro added today for her depression  *** July 01, 2019 ***  24h UFC- 18 TSH- 2.1 neg tpo/tg ab B12- 250 25D- 22 testo- 24.2. normal 17ohP/Preg fasting glu- 77 with insulin 14  HPI: She was struggling with her weight since her childhood. She gained about 100 lbs in her senior year of high school, and had continuously kept gaining weight since then. She was diagnosed with mild hypothyroidism during her fertility evaluation about 9 years ago, and was on L-thyroxine replacement for about a year. Since then her thyroid levels were fine, and she never required treatment with her second pregnancy. She was also diagnosed with a prediabetes at that time , and was briefly on metformin.  complains of  irregular menses x many years Menarche at 14 yo.  LMP - currently now.  She is on OCP's right now, and also taking phentermine; however is not successful on it. She tried weight watchers and other physician supervised weight loss program, which did not result in any meaningful weight loss. Had  difficulties getting pregnant , and undergone fertility treatment Miscarriages - none +facial acne  Denies increased facial/body hair growth, but does complain of scalp hair thinning Denies: breast discharge, shoe/ring size change, easy bruising or new purple stretch marks on the abdomen/thighs.  Last gyn exam- 2018 Pelvic US - 2018, reports normal. Last TSH- 3.54, a1c- 5.4

## 2024-10-24 ENCOUNTER — APPOINTMENT (OUTPATIENT)
Dept: ENDOCRINOLOGY | Facility: CLINIC | Age: 39
End: 2024-10-24
Payer: COMMERCIAL

## 2024-10-24 VITALS
OXYGEN SATURATION: 98 % | BODY MASS INDEX: 36.26 KG/M2 | SYSTOLIC BLOOD PRESSURE: 126 MMHG | HEIGHT: 67 IN | WEIGHT: 231 LBS | RESPIRATION RATE: 16 BRPM | HEART RATE: 77 BPM | DIASTOLIC BLOOD PRESSURE: 84 MMHG

## 2024-10-24 DIAGNOSIS — E28.2 POLYCYSTIC OVARIAN SYNDROME: ICD-10-CM

## 2024-10-24 DIAGNOSIS — E78.00 PURE HYPERCHOLESTEROLEMIA, UNSPECIFIED: ICD-10-CM

## 2024-10-24 DIAGNOSIS — E66.01 MORBID (SEVERE) OBESITY DUE TO EXCESS CALORIES: ICD-10-CM

## 2024-10-24 PROCEDURE — 99214 OFFICE O/P EST MOD 30 MIN: CPT

## 2024-11-14 ENCOUNTER — TRANSCRIPTION ENCOUNTER (OUTPATIENT)
Age: 39
End: 2024-11-14

## 2024-11-21 ENCOUNTER — TRANSCRIPTION ENCOUNTER (OUTPATIENT)
Age: 39
End: 2024-11-21

## 2024-12-02 ENCOUNTER — TRANSCRIPTION ENCOUNTER (OUTPATIENT)
Age: 39
End: 2024-12-02

## 2024-12-03 ENCOUNTER — TRANSCRIPTION ENCOUNTER (OUTPATIENT)
Age: 39
End: 2024-12-03

## 2024-12-19 ENCOUNTER — TRANSCRIPTION ENCOUNTER (OUTPATIENT)
Age: 39
End: 2024-12-19

## 2024-12-19 ENCOUNTER — NON-APPOINTMENT (OUTPATIENT)
Age: 39
End: 2024-12-19

## 2025-01-13 ENCOUNTER — TRANSCRIPTION ENCOUNTER (OUTPATIENT)
Age: 40
End: 2025-01-13

## 2025-01-21 ENCOUNTER — TRANSCRIPTION ENCOUNTER (OUTPATIENT)
Age: 40
End: 2025-01-21

## 2025-01-23 ENCOUNTER — APPOINTMENT (OUTPATIENT)
Dept: ENDOCRINOLOGY | Facility: CLINIC | Age: 40
End: 2025-01-23
Payer: COMMERCIAL

## 2025-01-23 VITALS
SYSTOLIC BLOOD PRESSURE: 129 MMHG | BODY MASS INDEX: 36.57 KG/M2 | HEIGHT: 67 IN | OXYGEN SATURATION: 100 % | DIASTOLIC BLOOD PRESSURE: 78 MMHG | RESPIRATION RATE: 16 BRPM | TEMPERATURE: 97.6 F | HEART RATE: 62 BPM | WEIGHT: 233 LBS

## 2025-01-23 DIAGNOSIS — E66.01 MORBID (SEVERE) OBESITY DUE TO EXCESS CALORIES: ICD-10-CM

## 2025-01-23 DIAGNOSIS — E28.2 POLYCYSTIC OVARIAN SYNDROME: ICD-10-CM

## 2025-01-23 DIAGNOSIS — E78.00 PURE HYPERCHOLESTEROLEMIA, UNSPECIFIED: ICD-10-CM

## 2025-01-23 DIAGNOSIS — I10 ESSENTIAL (PRIMARY) HYPERTENSION: ICD-10-CM

## 2025-01-23 PROCEDURE — 36415 COLL VENOUS BLD VENIPUNCTURE: CPT

## 2025-01-23 PROCEDURE — 99214 OFFICE O/P EST MOD 30 MIN: CPT

## 2025-01-24 ENCOUNTER — TRANSCRIPTION ENCOUNTER (OUTPATIENT)
Age: 40
End: 2025-01-24

## 2025-01-24 LAB
25(OH)D3 SERPL-MCNC: 29.2 NG/ML
ALBUMIN SERPL ELPH-MCNC: 4.6 G/DL
ALP BLD-CCNC: 72 U/L
ALT SERPL-CCNC: 15 U/L
ANION GAP SERPL CALC-SCNC: 11 MMOL/L
AST SERPL-CCNC: 15 U/L
BILIRUB SERPL-MCNC: 0.5 MG/DL
BUN SERPL-MCNC: 15 MG/DL
CALCIUM SERPL-MCNC: 9.4 MG/DL
CHLORIDE SERPL-SCNC: 109 MMOL/L
CHOLEST SERPL-MCNC: 110 MG/DL
CO2 SERPL-SCNC: 24 MMOL/L
CREAT SERPL-MCNC: 0.94 MG/DL
EGFR: 79 ML/MIN/1.73M2
ESTIMATED AVERAGE GLUCOSE: 74 MG/DL
FOLATE SERPL-MCNC: 13.5 NG/ML
GLUCOSE SERPL-MCNC: 78 MG/DL
HBA1C MFR BLD HPLC: 4.2 %
HDLC SERPL-MCNC: 60 MG/DL
LDLC SERPL CALC-MCNC: 35 MG/DL
NONHDLC SERPL-MCNC: 50 MG/DL
POTASSIUM SERPL-SCNC: 4.9 MMOL/L
PROT SERPL-MCNC: 7.3 G/DL
SODIUM SERPL-SCNC: 144 MMOL/L
T4 FREE SERPL-MCNC: 1.2 NG/DL
TRIGL SERPL-MCNC: 75 MG/DL
TSH SERPL-ACNC: 1.71 UIU/ML
VIT B12 SERPL-MCNC: 318 PG/ML

## 2025-01-29 ENCOUNTER — TRANSCRIPTION ENCOUNTER (OUTPATIENT)
Age: 40
End: 2025-01-29

## 2025-01-30 RX ORDER — LEUPROLIDE ACETATE 1 MG/0.2ML
5 VIAL (ML) SUBCUTANEOUS
Refills: 0 | Status: ACTIVE | COMMUNITY

## 2025-02-03 ENCOUNTER — APPOINTMENT (OUTPATIENT)
Dept: NEUROSURGERY | Facility: CLINIC | Age: 40
End: 2025-02-03
Payer: COMMERCIAL

## 2025-02-07 PROBLEM — I67.5 MOYA-MOYA DISEASE: Status: ACTIVE | Noted: 2025-02-07

## 2025-02-10 ENCOUNTER — APPOINTMENT (OUTPATIENT)
Dept: NEUROSURGERY | Facility: CLINIC | Age: 40
End: 2025-02-10
Payer: COMMERCIAL

## 2025-02-10 VITALS
RESPIRATION RATE: 18 BRPM | SYSTOLIC BLOOD PRESSURE: 128 MMHG | TEMPERATURE: 97.2 F | HEIGHT: 67 IN | DIASTOLIC BLOOD PRESSURE: 78 MMHG | WEIGHT: 233 LBS | OXYGEN SATURATION: 98 % | HEART RATE: 72 BPM | BODY MASS INDEX: 36.57 KG/M2

## 2025-02-10 DIAGNOSIS — I66.9 OCCLUSION AND STENOSIS OF UNSPECIFIED CEREBRAL ARTERY: ICD-10-CM

## 2025-02-10 DIAGNOSIS — I67.5 MOYAMOYA DISEASE: ICD-10-CM

## 2025-02-10 PROCEDURE — 99205 OFFICE O/P NEW HI 60 MIN: CPT

## 2025-02-13 ENCOUNTER — TRANSCRIPTION ENCOUNTER (OUTPATIENT)
Age: 40
End: 2025-02-13

## 2025-02-14 ENCOUNTER — APPOINTMENT (OUTPATIENT)
Dept: NEUROSURGERY | Facility: CLINIC | Age: 40
End: 2025-02-14

## 2025-02-18 ENCOUNTER — TRANSCRIPTION ENCOUNTER (OUTPATIENT)
Age: 40
End: 2025-02-18

## 2025-02-20 ENCOUNTER — TRANSCRIPTION ENCOUNTER (OUTPATIENT)
Age: 40
End: 2025-02-20

## 2025-02-21 ENCOUNTER — TRANSCRIPTION ENCOUNTER (OUTPATIENT)
Age: 40
End: 2025-02-21

## 2025-02-21 RX ORDER — TIRZEPATIDE 2.5 MG/.5ML
2.5 INJECTION, SOLUTION SUBCUTANEOUS
Qty: 1 | Refills: 0 | Status: ACTIVE | COMMUNITY
Start: 2025-02-21 | End: 1900-01-01

## 2025-03-17 ENCOUNTER — APPOINTMENT (OUTPATIENT)
Dept: NUCLEAR MEDICINE | Facility: HOSPITAL | Age: 40
End: 2025-03-17

## 2025-03-17 ENCOUNTER — OUTPATIENT (OUTPATIENT)
Dept: OUTPATIENT SERVICES | Facility: HOSPITAL | Age: 40
LOS: 1 days | End: 2025-03-17
Payer: COMMERCIAL

## 2025-03-17 ENCOUNTER — APPOINTMENT (OUTPATIENT)
Dept: MRI IMAGING | Facility: HOSPITAL | Age: 40
End: 2025-03-17

## 2025-03-17 PROCEDURE — 70544 MR ANGIOGRAPHY HEAD W/O DYE: CPT | Mod: 26

## 2025-03-17 RX ORDER — ACETAZOLAMIDE 250 MG/1
1000 TABLET ORAL ONCE
Refills: 0 | Status: DISCONTINUED | OUTPATIENT
Start: 2025-03-17 | End: 2025-03-31

## 2025-03-18 ENCOUNTER — APPOINTMENT (OUTPATIENT)
Dept: NUCLEAR MEDICINE | Facility: HOSPITAL | Age: 40
End: 2025-03-18

## 2025-03-18 PROCEDURE — 70544 MR ANGIOGRAPHY HEAD W/O DYE: CPT

## 2025-03-18 PROCEDURE — 78832 RP LOCLZJ TUM SPECT W/CT 2: CPT | Mod: 26

## 2025-03-18 PROCEDURE — A9557: CPT

## 2025-03-18 PROCEDURE — 78832 RP LOCLZJ TUM SPECT W/CT 2: CPT

## 2025-03-25 DIAGNOSIS — I67.5 MOYAMOYA DISEASE: ICD-10-CM

## 2025-03-25 DIAGNOSIS — I63.511 CEREBRAL INFARCTION DUE TO UNSPECIFIED OCCLUSION OR STENOSIS OF RIGHT MIDDLE CEREBRAL ARTERY: ICD-10-CM

## 2025-03-25 DIAGNOSIS — I66.01 OCCLUSION AND STENOSIS OF RIGHT MIDDLE CEREBRAL ARTERY: ICD-10-CM

## 2025-03-25 DIAGNOSIS — I67.82 CEREBRAL ISCHEMIA: ICD-10-CM

## 2025-03-25 DIAGNOSIS — Z01.818 ENCOUNTER FOR OTHER PREPROCEDURAL EXAMINATION: ICD-10-CM

## 2025-03-25 DIAGNOSIS — R93.0 ABNORMAL FINDINGS ON DIAGNOSTIC IMAGING OF SKULL AND HEAD, NOT ELSEWHERE CLASSIFIED: ICD-10-CM

## 2025-03-28 ENCOUNTER — APPOINTMENT (OUTPATIENT)
Dept: NEUROSURGERY | Facility: CLINIC | Age: 40
End: 2025-03-28
Payer: COMMERCIAL

## 2025-03-28 DIAGNOSIS — I66.9 OCCLUSION AND STENOSIS OF UNSPECIFIED CEREBRAL ARTERY: ICD-10-CM

## 2025-03-28 DIAGNOSIS — I67.5 MOYAMOYA DISEASE: ICD-10-CM

## 2025-03-28 PROCEDURE — 99215 OFFICE O/P EST HI 40 MIN: CPT | Mod: 95

## 2025-04-10 ENCOUNTER — APPOINTMENT (OUTPATIENT)
Dept: MRI IMAGING | Facility: HOSPITAL | Age: 40
End: 2025-04-10

## 2025-05-14 ENCOUNTER — TRANSCRIPTION ENCOUNTER (OUTPATIENT)
Age: 40
End: 2025-05-14

## 2025-05-15 ENCOUNTER — TRANSCRIPTION ENCOUNTER (OUTPATIENT)
Age: 40
End: 2025-05-15

## 2025-07-14 ENCOUNTER — APPOINTMENT (OUTPATIENT)
Dept: CARDIOLOGY | Facility: CLINIC | Age: 40
End: 2025-07-14

## 2025-07-22 ENCOUNTER — NON-APPOINTMENT (OUTPATIENT)
Age: 40
End: 2025-07-22

## 2025-07-24 ENCOUNTER — APPOINTMENT (OUTPATIENT)
Dept: ENDOCRINOLOGY | Facility: CLINIC | Age: 40
End: 2025-07-24
Payer: COMMERCIAL

## 2025-07-24 VITALS
HEART RATE: 71 BPM | WEIGHT: 232.38 LBS | DIASTOLIC BLOOD PRESSURE: 82 MMHG | BODY MASS INDEX: 36.47 KG/M2 | RESPIRATION RATE: 16 BRPM | SYSTOLIC BLOOD PRESSURE: 126 MMHG | OXYGEN SATURATION: 98 % | HEIGHT: 67 IN

## 2025-07-24 DIAGNOSIS — E28.2 POLYCYSTIC OVARIAN SYNDROME: ICD-10-CM

## 2025-07-24 DIAGNOSIS — E66.01 MORBID (SEVERE) OBESITY DUE TO EXCESS CALORIES: ICD-10-CM

## 2025-07-24 DIAGNOSIS — E03.9 HYPOTHYROIDISM, UNSPECIFIED: ICD-10-CM

## 2025-07-24 PROCEDURE — 36415 COLL VENOUS BLD VENIPUNCTURE: CPT

## 2025-07-24 PROCEDURE — 99214 OFFICE O/P EST MOD 30 MIN: CPT

## 2025-07-25 ENCOUNTER — TRANSCRIPTION ENCOUNTER (OUTPATIENT)
Age: 40
End: 2025-07-25

## 2025-07-25 LAB
AMYLASE/CREAT SERPL: 140 U/L
TSH SERPL-ACNC: 1.63 UIU/ML

## 2025-08-06 ENCOUNTER — TRANSCRIPTION ENCOUNTER (OUTPATIENT)
Age: 40
End: 2025-08-06

## 2025-08-07 ENCOUNTER — TRANSCRIPTION ENCOUNTER (OUTPATIENT)
Age: 40
End: 2025-08-07

## 2025-08-14 ENCOUNTER — TRANSCRIPTION ENCOUNTER (OUTPATIENT)
Age: 40
End: 2025-08-14

## 2025-08-18 ENCOUNTER — TRANSCRIPTION ENCOUNTER (OUTPATIENT)
Age: 40
End: 2025-08-18